# Patient Record
(demographics unavailable — no encounter records)

---

## 2018-12-01 NOTE — XMS REPORT
Summary of Care: 1/10/17 - 1/10/17

                             Created on: 2041



ALEJANDRO JACKSON

External Reference #: [id number]

: 1970

Sex: Female



Demographics







                          Address                   [address]

[place]  [address]-

 

                          Home Phone                [phone]

 

                          Preferred Language        English

 

                          Marital Status            Single

 

                          Faith Affiliation     Taoist

 

                          Race                      Other

 

                          Ethnic Group              /Latin





Author







                          Author                    [facility]

 

                          Organization              [facility]

 

                          Address                   Unknown

 

                          Phone                     Unavailable







Encounter





HQ El_antonio(FRANK) [id number] Date(s): 1/10/17 - 1/10/17

[facility] [address] [place] [address]-     [phone]
[phone]

Discharge Disposition: Home or Self Care

Attending Physician: Ro Wilson MD

Admitting Physician: Ro Wilson MD





Vital Signs





No data available for this section



Problem List







    



              Condition     Effective Dates     Status       Health Status     Informant

 

    



                           Anxiety(Confirmed)        Active  

 

    



                           Asthma(Confirmed)         Active  

 

    



                           Back pain(Confirmed)      Active  

 

    



                           Depression(Confirmed      Active  



                                         )    

 

    



                           Reflux(Confirmed)         Active  

 

    



                           Short of breath on        Active  



                                         exertion(Confirmed)    







Allergies, Adverse Reactions, Alerts







   



                 Substance       Reaction        Severity        Status

 

   



                           aspirin                   Active

 

   



                           Bactrim                   Active

 

   



                     Latex               Blisters            Active

 

   



                           penicillin                Active

 

   



                           sulfa drugs               Active







Medications





No data available for this section



Results





No data available for this section



Immunizations





No data available for this section



Procedures







   



                 Procedure       Date            Related Diagnosis     Body Site

 

   



                                         Appendectomy   

 

   



                                         Bladder operation   

 

   



                                         Cholecystectomy   

 

   



                                         Fusion of lumbar spine   

 

   



                                         Hernia repair   

 

   



                                         Hysterectomy   

 

   



                                         Procedure1   







1multiple back procedures



Social History







 



                           Social History Type       Response

 

 



                           Smoking Status            Former smoker; Type: Cigarettes; Exposure to Tobacco Smoke None;

 Cigarette



                                         Smoking Last 365 Days No; Reg Smoking Cessation Counseling No







Assessment and Plan





No data available for this section

## 2018-12-01 NOTE — XMS REPORT
Summary of Care: 17 - 17

                             Created on: 07/15/2126



ALEJANDRO JACKSON

External Reference #: [id number]

: 1970

Sex: Female



Demographics







                          Address                   [address] 

[place]  [address]-

 

                          Home Phone                [phone]

 

                          Preferred Language        English

 

                          Marital Status            Single

 

                          Baptist Affiliation     Restorationist

 

                          Race                      Other

 

                          Ethnic Group              /Latin





Author







                          Author                    [facility]

 

                          Organization              [facility]

 

                          Address                   Unknown

 

                          Phone                     Unavailable







Encounter





DENISSE Huggins(FRANK) [id number] Date(s): 17 - 17

[facility] [address] [place] [address]-     [phone]
[phone]

Discharge Diagnosis: Acute bronchitis

Discharge Disposition: Home or Self Care

Attending Physician: Navid Nelson MD





Vital Signs







                    1                   2                   3



                                         Most recent to   



                                         oldest [Reference   



                                         Range]:   

 

                                        157.48 cm 

                    (17 8:50 AM)                         



                                         Height   

 

                                        98.4 DegF 

                          (17 1:47 PM)         98.4 DegF 

                          (17 8:50 AM)          



                                         Temperature Oral   



                                         [96.4-99.1 DegF]   

 

                                        115/57 mmHg 

                          (17 1:47 PM)         96/55 mmHg 

                          (17 12:53 PM)        99/50 mmHg 

                                        (17 11:30 AM)



                                         Blood Pressure   



                                         [/60-90 mmHg]   

 

                                        15 BRMIN 

                          (17 1:47 PM)         15 BRMIN 

                          (17 12:53 PM)        14 BRMIN 

                                        (17 11:30 AM)



                                         Respiratory Rate   



                                         [14-20 BRMIN]   

 

                                        77 bpm 

                    (17 8:50 AM)                         



                                         Peripheral Pulse   



                                         Rate [ bpm]   

 

                                        109.091 kg 

                    (17 8:50 AM)                         



                                         Weight   

 

                                        43.99 m2 

                    (17 8:50 AM)                         



                                         Body Mass Index   







Problem List







    



              Condition     Effective Dates     Status       Health Status     Informant

 

    



                           Anxiety(Confirmed)        Active  

 

    



                           Asthma(Confirmed)         Active  

 

    



                           Back pain(Confirmed)      Active  

 

    



                           Depression(Confirmed      Active  



                                         )    

 

    



                           Reflux(Confirmed)         Active  

 

    



                           Short of breath on        Resolved  



                                         exertion(Confirmed)    







Allergies, Adverse Reactions, Alerts







   



                 Substance       Reaction        Severity        Status

 

   



                           aspirin                   Active

 

   



                           Bactrim                   Active

 

   



                     Latex               Blisters            Active

 

   



                           lovastatin                Active

 

   



                           methadone                 Active

 

   



                           penicillin                Active

 

   



                           sulfa drugs               Active







Medications





albuterol-ipratropium 2.5-0.5 mg inhalation solution

3 mL, Route: NEB, Drug Form: SOLN, Dosing Weight 109.091, kg, ONCE, STAT, Start 
date: 17 10:06:00 CST, Stop date: 17 10:06:00 CST

Start Date: 17

Stop Date: 17

Status: Completed



azithromycin 250 mg oral tablet

See Instructions, Take 2 tablets by mouth the first day then 1 tablet by mouth d
aily on days 2-5., X 5 day, # 6 tab, 0 Refill(s)

Start Date: 17

Stop Date: 17

Status: Ordered



methylPREDNISolone SODium SUCCinate

125 mg, 2 mL, Route: IVP, Drug form: INJ, ONCE, Dosing Weight 109.091, kg, Prior
ity: STAT, Start date: 17 10:06:00 CST, Stop date: 17 10:06:00 CST

Notes: (Same as:Solu-MEDROL, A-Methapred)

Start Date: 17

Stop Date: 17

Status: Completed



NS (Bolus) IV

1,000 mL, 1,000 ml/hr, Infuse Over: 1 hr, Route: IV, ONCE, Priority: STAT, Dosin
g Weight 109.091 kg, Start date: 17 10:52:00 CST, Duration: 1 doses or thania
es, Stop date: 17 10:52:00 CST

Start Date: 17

Stop Date: 17

Status: Completed



predniSONE 50 mg oral tablet

50 mg=1 tab, PO, Daily, X 5 day, # 5 tab, 0 Refill(s)

Start Date: 17

Stop Date: 17

Status: Ordered



Results





ELECTROLYTES





 



                           Most recent to            1



                                         oldest [Reference 



                                         Range]: 

 

 



                           Sodium Lvl [135-145       133 mEq/L



                           mEq/L]                    *LOW*



                                         (17 10:42 AM)

 

 



                           Potassium Lvl             3.8 mEq/L



                           [3.5-5.1 mEq/L]           (17 10:42 AM)

 

 



                           Chloride Lvl [      94 mEq/L



                           mEq/L]                    *LOW*



                                         (17 10:42 AM)

 

 



                           CO2 [24-32 mEq/L]         31 mEq/L



                                         (17 10:42 AM)

 

 



                           AGAP [10.0-20.0           11.8 mEq/L



                           mEq/L]                    (17 10:42 AM)







CHEM PANEL





 



                           Most recent to            1



                                         oldest [Reference 



                                         Range]: 

 

 



                           Creatinine Lvl            0.78 mg/dL



                           [0.50-1.40 mg/dL]         (17 10:42 AM)

 

 



                           eGFR                      92 mL/min/1.73m2 1



                                         *NA*



                                         (17 10:42 AM)

 

 



                           BUN [7-22 mg/dL]          13 mg/dL



                                         (17 10:42 AM)

 

 



                           B/C Ratio [6-25]          17



                                         (17 10:42 AM)

 

 



                           Glucose Lvl [70-99        99 mg/dL



                           mg/dL]                    (17 10:42 AM)

 

 



                           Total Protein             8.2 g/dL



                           [6.4-8.4 g/dL]            (17 10:42 AM)

 

 



                           Albumin Lvl [3.5-5.0      3.8 g/dL



                           g/dL]                     (17 10:42 AM)

 

 



                           Globulin [2.7-4.2         4.4 g/dL



                           g/dL]                     *HI*



                                         (17 10:42 AM)

 

 



                           A/G Ratio [0.7-1.6]       0.9



                                         (17 10:42 AM)

 

 



                           Calcium Lvl               9.2 mg/dL



                           [8.5-10.5 mg/dL]          (17 10:42 AM)

 

 



                           ALT [0-65 unit/L]         21 unit/L



                                         (17 10:42 AM)

 

 



                           AST [0-37 unit/L]         19 unit/L



                                         (17 10:42 AM)

 

 



                           Alk Phos [          158 unit/L



                           unit/L]                   *HI*



                                         (17 10:42 AM)

 

 



                           Bili Total [0.2-1.3       0.7 mg/dL



                           mg/dL]                    (17 10:42 AM)







1Result Comment: The eGFR is calculated using the CKD-EPI formula. In most 
young, healthy individuals the eGFR will be >90 mL/min/1.73m2. The eGFR declines
with age. An eGFR of 60-89 may be normal in some populations, particularly the 
elderly, for whom the CKD-EPI formula has not been extensively validated. Use of
the eGFR is not recommended in the following populations:



Individuals with unstable creatinine concentrations, including pregnant patients
and those with serious co-morbid conditions.



Patients with extremes in muscle mass or diet. 



The data above are obtained from the National Kidney Disease Education Program (
NKDEP) which additionally recommends that when the eGFR is used in patients with
extremes of body mass index for purposes of drug dosing, the eGFR should be mul
tiplied by the estimated BMI.



CARDIAC ENZYMES





 



                           Most recent to            1



                                         oldest [Reference 



                                         Range]: 

 

 



                           Total CK [          57 unit/L



                           unit/L]                   (17 10:42 AM)

 

 



                           CK MB [0.5-3.6            <0.5 ng/mL



                           ng/mL]                    (17 10:42 AM)

 

 



                           CK MB Index               <0.9



                           [0.0-2.5]                 (17 10:42 AM)

 

 



                           Troponin-I                <0.02 ng/mL



                           [0.00-0.40 ng/mL]         (17 10:42 AM)







ENDOCRINOLOGY





 



                           Most recent to            1



                                         oldest [Reference 



                                         Range]: 

 

 



                           hCG Tot                   <1 mIU/mL



                                         *NA*



                                         (17 10:42 AM)







HEMATOLOGY





 



                           Most recent to            1



                                         oldest [Reference 



                                         Range]: 

 

 



                           WBC [3.7-10.4 K/CMM]      9.4 K/CMM



                                         (17 10:42 AM)

 

 



                           RBC [4.20-5.40            4.20 M/CMM



                           M/CMM]                    (17 10:42 AM)

 

 



                           Hgb [12.0-16.0 g/dL]      12.2 g/dL



                                         (17 10:42 AM)

 

 



                           Hct [36.0-48.0 %]         36.5 %



                                         (17 10:42 AM)

 

 



                           MCV [80.0-98.0 fL]        87.0 fL



                                         (17 10:42 AM)

 

 



                           MCH [27.0-31.0 pg]        29.1 pg



                                         (17 10:42 AM)

 

 



                           MCHC [32.0-36.0           33.5 g/dL



                           g/dL]                     (17 10:42 AM)

 

 



                           RDW [11.5-14.5 %]         14.1 %



                                         (17 10:42 AM)

 

 



                           Platelet [133-450         374 K/CMM



                           K/CMM]                    (17 10:42 AM)

 

 



                           MPV [7.4-10.4 fL]         8.7 fL



                                         (17 10:42 AM)

 

 



                           Segs [45.0-75.0 %]        79.0 %



                                         *HI*



                                         (17 10:42 AM)

 

 



                           Lymphocytes               11.2 %



                           [20.0-40.0 %]             *LOW*



                                         (17 10:42 AM)

 

 



                           Monocytes [2.0-12.0       7.5 %



                           %]                        (17 10:42 AM)

 

 



                           Eosinophils [0.0-4.0      1.8 %



                           %]                        (17 10:42 AM)

 

 



                           Basophils [0.0-1.0        0.5 %



                           %]                        (17 10:42 AM)

 

 



                           Segs-Bands #              7.4 K/CMM



                           [1.5-8.1 K/CMM]           (17 10:42 AM)

 

 



                           Lymphocytes #             1.1 K/CMM



                           [1.0-5.5 K/CMM]           (17 10:42 AM)

 

 



                           Monocytes # [0.0-0.8      0.7 K/CMM



                           K/CMM]                    (17 10:42 AM)

 

 



                           Eosinophils #             0.2 K/CMM



                           [0.0-0.5 K/CMM]           (17 10:42 AM)







Immunizations





No data available for this section



Procedures







   



                 Procedure       Date            Related Diagnosis     Body Site

 

   



                                         Appendectomy   

 

   



                                         Bladder operation   

 

   



                                         Cholecystectomy   

 

   



                                         Fusion of lumbar spine   

 

   



                                         Hernia repair   

 

   



                                         Hysterectomy   

 

   



                                         Implantation of electronic stimulator of   



                                         spine   

 

   



                                         Procedure1   







1multiple back procedures



Social History







 



                           Social History Type       Response

 

 



                           Substance Abuse           Use: None.

 

 



                           Alcohol                   Never, Previous treatment: None.

 

 



                           Smoking Status            Former smoker; Type: Cigarettes; Exposure to Tobacco Smoke None;

 Cigarette



                                         Smoking Last 365 Days No; Reg Smoking Cessation Counseling No







Assessment and Plan





No data available for this section

## 2018-12-01 NOTE — XMS REPORT
Summary of Care: 5/15/17 - 5/15/17

                             Created on: 2034



ALEJANDRO JACKSON

External Reference #: [id number]

: 1970

Sex: Female



Demographics







                          Address                   [address]

[place]  [address]-

 

                          Home Phone                [phone]

 

                          Preferred Language        English

 

                          Marital Status            Single

 

                          Christianity Affiliation     Rastafari

 

                          Race                      Other

 

                          Ethnic Group              /Latin





Author







                          Author                    [facility]

 

                          Organization              [facility]

 

                          Address                   Unknown

 

                          Phone                     Unavailable







Encounter





DENISSE Huggins(FRANK) [id number] Date(s): 5/15/17 - 5/15/17

[facility] [address] [place] [address]-     [phone]
[phone]

Discharge Disposition: Home or Self Care

Attending Physician: Lobo Mccord MD

Referring Physician: Lobo Mccord MD





Vital Signs







                    1                   2                   3



                                         Most recent to   



                                         oldest [Reference   



                                         Range]:   

 

                                        157.48 cm 

                    (17 11:04 AM)                         



                                         Height   

 

                                        98.0 DegF 

                    (17 11:04 AM)                         



                                         Temperature Oral   



                                         [96.4-99.1 DegF]   

 

                                        105/89 mmHg 

                          (5/15/17 9:45 AM)         150/65 mmHg 

*HI*

                          (5/15/17 9:30 AM)         116/67 mmHg 

                                        (5/15/17 9:00 AM)



                                         Blood Pressure   



                                         [/60-90 mmHg]   

 

                                        20 BRMIN 

                          (5/15/17 10:00 AM)        18 BRMIN 

                          (5/15/17 9:45 AM)         16 BRMIN 

                                        (5/15/17 9:15 AM)



                                         Respiratory Rate   



                                         [14-20 BRMIN]   

 

                                        92 bpm 

                    (17 11:04 AM)                         



                                         Peripheral Pulse   



                                         Rate [ bpm]   

 

                                        104.176 kg 

                    (17 11:04 AM)                         



                                         Weight   

 

                                        42.01 m2 

                    (17 11:04 AM)                         



                                         Body Mass Index   







Problem List







    



              Condition     Effective Dates     Status       Health Status     Informant

 

    



                           Anxiety(Confirmed)        Active  

 

    



                           Asthma(Confirmed)         Active  

 

    



                           Back pain(Confirmed)      Active  

 

    



                           Depression(Confirmed      Active  



                                         )    

 

    



                           Reflux(Confirmed)         Active  

 

    



                           Short of breath on        Resolved  



                                         exertion(Confirmed)    







Allergies, Adverse Reactions, Alerts







   



                 Substance       Reaction        Severity        Status

 

   



                           aspirin                   Active

 

   



                           Bactrim                   Active

 

   



                     Latex               Blisters            Active

 

   



                           lovastatin                Active

 

   



                           methadone                 Active

 

   



                           penicillin                Active

 

   



                           sulfa drugs               Active







Medications





acetaminophen-oxycodone 325 mg-10 mg oral tablet

2 tab, PO, Q6H, PRN for pain, 0 Refill(s)

Start Date: 17

Stop Date: 17

Status: Ordered



baclofen 20 mg oral tablet

20 mg=1 tab, PO, TID, # 270 tab, 0 Refill(s)

Start Date: 17

Status: Ordered



dicyclomine

20 mg, 0 Refill(s)

Start Date: 17

Status: Ordered



furosemide 40 mg oral tablet

40 mg=1 tab, PO, Daily, # 30 tab, 0 Refill(s)

Start Date: 17

Status: Ordered



Movantik 25 mg oral tablet

25 mg=1 tab, PO, QAM, 0 Refill(s)

Start Date: 17

Status: Ordered



naloxone 1 mg/mL injectable solution

IV, ONCE, 0 Refill(s)

Start Date: 17

Status: Ordered



omeprazole 40 mg oral delayed release capsule

40 mg=1 cap, PO, Daily, # 30 cap, 0 Refill(s)

Start Date: 17

Status: Ordered



phentermine 37.5 mg oral tablet

37.5 mg=1 tab, PO, Daily, # 30 tab, 0 Refill(s)

Start Date: 17

Stop Date: 17

Status: Ordered



sertraline 50 mg oral tablet

50 mg=1 tab, PO, Daily, # 30 tab, 0 Refill(s)

Start Date: 17

Status: Ordered



Singulair

Daily, 0 Refill(s)

Start Date: 17

Status: Ordered



Sodium Chloride 0.9% IV 1000 mL

1,000 mL, Rate: 25 ml/hr, Infuse over: 40 hr, Route: IV, Dosing Weight 104.176 k
g, Total Volume: 1,000, Start date: 05/15/17 7:02:00 CDT, Duration: 30 day, Stop
date: 17 7:01:00 CDT

Start Date: 5/15/17

Stop Date: 5/15/17

Status: Discontinued



Results





ELECTROLYTES





 



                           Most recent to            1



                                         oldest [Reference 



                                         Range]: 

 

 



                           Sodium Lvl [135-145       139 mEq/L



                           mEq/L]                    (17 11:13 AM)

 

 



                           Potassium Lvl             3.5 mEq/L



                           [3.5-5.1 mEq/L]           (17 11:13 AM)

 

 



                           Chloride Lvl [      102 mEq/L



                           mEq/L]                    (17 11:13 AM)

 

 



                           CO2 [24-32 mEq/L]         26 mEq/L



                                         (17 11:13 AM)

 

 



                           AGAP [10.0-20.0           14.5 mEq/L



                           mEq/L]                    (17 11:13 AM)







Immunizations





No data available for this section



Procedures







   



                 Procedure       Date            Related Diagnosis     Body Site

 

   



                                         Appendectomy   

 

   



                                         Bladder operation   

 

   



                                         Cholecystectomy   

 

   



                                         Fusion of lumbar spine   

 

   



                                         Hernia repair   

 

   



                                         Hysterectomy   

 

   



                                         Implantation of electronic stimulator of   



                                         spine   

 

   



                                         Procedure1   







1multiple back procedures



Social History







 



                           Social History Type       Response

 

 



                           Substance Abuse           Use: None.

 

 



                           Alcohol                   Never, Previous treatment: None.

 

 



                           Smoking Status            Former smoker; Type: Cigarettes; Exposure to Tobacco Smoke None;

 Cigarette



                                         Smoking Last 365 Days No; Reg Smoking Cessation Counseling No







Assessment and Plan





No data available for this section

## 2018-12-01 NOTE — XMS REPORT
Encounter CCD: 2012 to 2012

                             Created on: 2045



ALEJANDRO JACKSON

External Reference #: [id number]

: 1970

Sex: Female



Demographics







                          Address                   [address] [place] [address]  [address]

 

                          Home Phone                [phone]

 

                          Preferred Language        Unknown

 

                          Marital Status            [employer]

 

                          Hoahaoism Affiliation     Voodoo

 

                          Race                      Other

 

                          Ethnic Group              /Latin





Author







                          Author                    [employer]

 

                          Organization              [facility]

 

                          Address                   Unknown

 

                          Phone                     Unavailable







Care Team Providers







                    Care Team Member Name    Role                Phone

 

                    Ro Wilson        RP                  [phone]







Allergies, Adverse Reactions, Alerts







  



                     Substance           Reaction            Status

 

  



                           aspirin                   Active

 

  



                           Bactrim                   Active

 

  



                     Latex               Blisters            Active

 

  



                           penicillin                Active

 

  



                           sulfa drugs               Active







Medications







    



              Medication     Instructions     Start Date     End Date     Status

 

    



              Benadryl     25 mg, Route: IVP, ONCE, PRN     2012     Completed



                                         Itching, Start date: 12   



                                         11:02:00   

 

    



              lidocaine 1%     0.5 mL, Route: SUB-Q, Drug Form:     01/10/2012     2012     Discontinued





                                         INJ, ONCALL, Start date: 01/10/12   



                                         15:00:00, Duration: 1 doses or   



                                         times   

 

    



              Lactated Ringers     1,000 mL, Rate: 25 ml/hr, Infuse     01/10/2012     2012    

 Discontinued



                           Injection IV 1,000        over: 40 hr, Route: IV, Total   



                           mL                        Volume: 1,000, Start date: 01/10/12   



                                         14:44:00, Duration: 30 day, Stop   



                                         date: 12 14:43:00   

 

    



                 Ventolin HFA     INHALATION, Substitution Allowed,     2012      Ordered



                                         Maintenance   







Vital Signs







                Most recent to oldest [Reference Range]:    1               2               3

 

                          Height                    157.48 cm 

                    (01/10/2012 14:44:00)                           

 

                          Temperature Oral [96.4-99.1 DegF]    98.1 DegF 

                    (01/10/2012 15:08:00)                           

 

                          Systolic Blood Pressure [ mmHg]    119 mmHg 

                          (2012 11:30:00)      133 mmHg 

                          (2012 11:00:00)      134 mmHg 

                                        (2012 10:45:00)  

 

                          Diastolic Blood Pressure [60-90 mmHg]    65 mmHg 

                          (2012 11:30:00)      64 mmHg 

                          (2012 11:00:00)      81 mmHg 

                                        (2012 10:45:00)  

 

                          Respiratory Rate [14-20 BRMIN]    15 BRMIN 

                          (2012 11:00:00)      15 BRMIN 

                          (2012 10:45:00)      20 BRMIN 

                                        (2012 08:51:00)  

 

                          Peripheral Pulse Rate [ bpm]    82 bpm 

                          (2012 08:51:00)      70 bpm 

                          (01/10/2012 15:08:00)       

 

                          Weight                    95.455 kg 

                    (01/10/2012 14:44:00)

## 2018-12-01 NOTE — DIAGNOSTIC IMAGING REPORT
EXAM: Abdomen 2  Views

INDICATION:     

^states constipation

^[id number]

^1240

COMPARISON: CT dated 10/11/2016



FINDINGS:

Right lower abdomen spine stimulator device with leads overlying the thoracic

spine vertebral bodies.

Lung bases are clear.

Dilated gas-filled bowel loops in the central abdomen. No signs of

pneumoperitoneum.

No acute osseous or mildly. Narrowing of lower lumbar spine disc spaces. Lower

lumbar spine fixating pin.





IMPRESSION:

1.  Gaseous distention of bowel loops, concerning for at least partial

obstruction.



Signed by: Dr. Henry Centeno MD on 12/1/2018 1:38 PM

## 2018-12-01 NOTE — XMS REPORT
Encounter??CCD:??10/26/2011??to??10/26/2011

                             Created on: 2076



ALEJANDRO JACKSON

External Reference #: [id number]

: 1970

Sex: Female



Demographics







                          Address                   [address] [place]  [address]-

 

                          Home Phone                [phone]

 

                          Preferred Language        Unknown

 

                          Marital Status            T

 

                          Jain Affiliation     OT

 

                          Race                      [id number]

 

                          Ethnic Group              OTH





Author







                          Author                    [employer]

 

                          Organization              [facility]

 

                          Address                   Unknown

 

                          Phone                     Unavailable







Care Team Providers







                    Care Team Member Name    Role                Phone

 

                    Rowan Carty      CP                  [phone]

 

                    Mary Hammond    CP                  [phone]

 

                    [employer], Login    CP                  Unavailable

 

                    Devon Reyes Germán     CP                  [phone]

 

                    Sindhu Cheema    CP                  Unavailable

 

                    Keke Wilsonjuan        RP                  [phone]

 

                    SYSTEM, SYSTEM      CP                  Unavailable

 

                    Nel Lovell    CP                  [phone]

 

                    Shamar Morrison     CP                  Unavailable

 

                    Nita Barber        CP                  [phone]

 

                    Destiny Oliva    CP                  Unavailable

 

                    Dacia Burns    CP                  [phone]







Allergies, Adverse Reactions, Alerts







  



                     Substance           Reaction            Status

 

  



                     aspirin             ??                  Active

 

  



                     Bactrim             ??                  Active

 

  



                     penicillin          ??                  Active

 

  



                     sulfa drugs         ??                  Active







Medications







    



              Medication     Instructions     Start Date     End Date     Status

 

    



              clindamycin 150 mg     1 cap, PO, Q6H, 40 cap,     10/25/2011     ??           Ordered



                           oral capsule              Substitution Allowed, CAP   

 

    



              ondansetron     4 mg, 2 mL, Route: IVP, Drug form:     10/26/2011     10/26/2011     Discontinued





                                         INJ, ONCE, PRN Nausea & Vomiting,   



                                         Start date: 10/26/11 8:37:00   

 

    



              flumazenil     0.2 mg, 2 mL, Route: IVP, Drug     10/26/2011     10/26/2011     Discontinued





                                         form: INJ, PRN, PRN Other -See   



                                         Comment, Initial dose, Start date:   



                                         10/26/11 8:37:00, Duration: 30 day,   



                                         Stop date: 11 7:36:00   

 

    



              naloxone     0.04 mg, 0.1 mL, Route: IVP, Drug     10/26/2011     10/26/2011     Discontinued





                                         form: INJ, Q2MIN, PRN Narcotic   



                                         Reversal, Start date: 10/26/11   



                                         8:37:00, Duration: 8 doses or   



                                         times, Stop date: Limited # of   



                                         times   

 

    



              meperidine     12.5 mg, 0.25 mL, Route: IVP, Drug     10/26/2011     10/26/2011     Discontinued





                                         form: INJ, Q30Min, PRN Other -See   



                                         Comment, For shivering, Start date:   



                                         10/26/11 8:37:00, Duration: 2 doses   



                                         or times, Stop date: Limited # of   



                                         times   

 

    



              hydromorphone     0.5 mg, 0.5 mL, Route: IVP, Drug     10/26/2011     10/26/2011     Discontinued





                                         form: SOLN, Q5Min, PRN Pain Score   



                                         4-6, Start date: 10/26/11 8:37:00,   



                                         Duration: 5 doses or times, Stop   



                                         date: Limited # of times   

 

    



              fentanyl     25 microgram, 0.5 mL, Route: IVP,     10/26/2011     10/26/2011     Discontinued





                                         Drug form: INJ, Q5Min, PRN Pain   



                                         Score 4-6, Start date: 10/26/11   



                                         8:37:00, Duration: 4 doses or   



                                         times, Stop date: Limited # of   



                                         times   

 

    



                 acetaminophen-hydroc     2 tab, Route: PO, Drug Form: TAB,     10/26/2011      10/26/2011

                                         Discontinued



                           odone 325 mg-5 mg         Q4H, PRN Pain Score 4-6, Start   



                           oral tablet               date: 10/26/11 8:37:00, Duration:   



                                         30 day, Stop date: 11 8:36:00   

 

    



              lidocaine 1%     0.5 mL, Route: SUB-Q, Drug Form:     10/26/2011     10/26/2011     Discontinued





                                         INJ, ONCALL, Start date: 10/26/11   



                                         8:00:00, Duration: 1 doses or times   

 

    



              Lactated Ringers     1,000 mL, Rate: 25 ml/hr, Infuse     10/26/2011     10/26/2011    

 Discontinued



                           Injection IV 1,000        over: 40 hr, Route: IV, Total   



                           mL                        Volume: 1,000, Start date: 10/26/11   



                                         7:33:00, Duration: 30 day, Stop   



                                         date: 11 7:32:00   

 

    



              Erythrocin Stearate     1 tab, PO, Q8H, 40 tab,     10/25/2011     ??           Ordered



                           Filmtab 250 mg oral       Substitution Allowed, TAB   



                                         tablet    

 

    



              Nexium 40 mg oral     1 cap, PO, Daily, 30 cap,     10/25/2011     ??           Ordered



                           delayed release           Substitution Allowed   



                                         capsule    

 

    



              Norco 10/325 oral     1 tab, PO, BID, PRN, 24 tab, for     10/25/2011     ??           Ordered





                           tablet                    pain, Substitution Allowed,   



                                         Maintenance   







Vital Signs







                Most recent to oldest [Reference Range]:    1               2               3

 

                          Height                    160.02 cm 

                    (10/26/2011 07:33:00) ??    ??                  ??

 

                          Temperature Oral [96.4-99.1 DegF]    97.6 DegF 

                    (10/26/2011 07:24:00) ??    ??                  ??

 

                          Systolic Blood Pressure [ mmHg]    128 mmHg 

                          (10/26/2011 09:15:00) ??    133 mmHg 

                          (10/26/2011 09:00:00) ??    126 mmHg 

                                        (10/26/2011 08:45:00) ??

 

                          Diastolic Blood Pressure [60-90 mmHg]    60 mmHg 

                          (10/26/2011 09:15:00) ??    55 mmHg 

*LOW*

                          (10/26/2011 09:00:00) ??    80 mmHg 

                                        (10/26/2011 08:45:00) ??

 

                          Respiratory Rate [14-20 BRMIN]    18 BRMIN 

                          (10/26/2011 09:15:00) ??    21 BRMIN 

*HI*

                          (10/26/2011 09:00:00) ??    20 BRMIN 

                                        (10/26/2011 08:45:00) ??

 

                          Peripheral Pulse Rate [ bpm]    81 bpm 

                    (10/26/2011 07:24:00) ??    ??                  ??

 

                          Weight                    81.818 kg 

                    (10/26/2011 07:33:00) ??    ??                  ??

## 2018-12-01 NOTE — XMS REPORT
Continuity of Care Document

                             Created on: 10/15/2018



ALEJANDRO JACKSON

External Reference #: [id number]

: 1970

Sex: Female



Demographics







                          Address                   [address]  [address]

 

                          Home Phone                [phone]

 

                          Preferred Language        Unknown

 

                          Marital Status            Unknown

 

                          Buddhism Affiliation     Unknown

 

                          Race                      Unknown

 

                          Ethnic Group              Unknown





Author







                          Author                    [facility]              Interface

 

                          Address                   Unknown

 

                          Phone                     Unavailable



                                                    



Problems

                    





                    Problem                            Status                            Onset Date     

                          Classification                            Date Reported       

                          Comments                            Source                    

 

                    DX: M54.16                            Active                            10/10/2018  

                                                                                       

                                        [facility]                    

 

                          DX: RT MASS **CALLBACKS**                            Active                       

                    2018                                                                        

                                                      [facility]                    

 

                          ROUTINE SCREENING **LAST MMG W/**                            Active             

                    2018                                                              

                                                      [facility]                    

 

                          Discharge Diagnosis: Acute bronchitis                                             

                    2017

                                                        [facility]                 

   

 

                    NAUSEA/VOMITING                            Active                            2017

                                                                                       

                                        [facility]                    

 

                    UNK                            Active                            2017         

                                                                                        

                                        [facility]                    

 

                    DX: 6 MONTH FOLLOWUP                            Active                            2017

                                                                                       

                                        [facility]                    

 

                          LYNNE DELVALLE #[phone] FAX#[phone]                            Active        

                    2016                                                         

                                                       [facility]                  

  

 

                          793.80 ***LYNNE AGUIRRE PHONE#[phone]                            Active        

                    2015                                                         

                                                       [facility]                  

  

 

                          .4 724.4 / CPT 25041  91242                             Active        

                    10/13/2014                                                         

                                                       [facility]                  

  

 

                          793.80 ABNORMAL FINDING ON MAMMOGRAPHY                            Active          

                    2014                                                           

                                                      [facility]                    



 

                          V76.11 - SCREEN MAMMOGRA                            Active                        

                    2014                                                                         

                                                       [facility] [place]                    

 

                          715.09 - GENERAL OSTEOAR                            Active                        

                    2013                                                                         

                                                       [facility] [place]                    

 

                    721.3                            Active                            2013       

                                                                                       

                                        [facility]                    

 

                          .3 / CPT 41197 59784 09718                            Active               

                    2012                                                                

                                                      [facility]                    

 

                          INJECTION EPIDURAL STEROID BLOCK                            Active                

                    10/19/2011                                                                 

                                                      [facility]                    

 

                    Anxiety                            Active                                           

                    Problem                            2017                             

                                        [facility] [place]                    

 

                    Asthma                            Active                                            

                    Problem                            2017                              

                                        [facility] [place]                    

 

                    Back pain                            Active                                         

                          Problem                            2017                         

                                                      [facility] [place]                    

 

                    Depression                            Active                                        

                          Problem                            2017                        

                                                      [facility] [place]                    

 

                    Reflux                            Active                                            

                    Problem                            2017                              

                                        [facility] [place]                    

 

                          Short of breath on exertion                            Resolved                   

                                                Problem                            2017     

                                                      [facility] [place]     

               

 

                    Anxiety                            Resolved                                         

                          Problem                            2013                         

                                                       [facility]                    

 

                    Asthma                            Resolved                                          

                    Problem                            2013                            

                                         [facility] [place]                    

 

                    Back pain                            Resolved                                       

                          Problem                            2013                       

                                                       [facility] [place],[facility]                    

 

                          Depression annual review                            Resolved                      

                                                Problem                            2013        

                                                       [facility] [place],[facility]      

              

 

                    Reflux                            Resolved                                          

                    Problem                            2013                            

                                         [facility] [place]                    

 

                    cough                            Active                                             

                    Problem                            2014                               

                                         [facility] [place]                    

 

                    seizures                            Active                                          

                    Problem                            2014                            

                                         [facility] [place]                    

 

                          Depression annual review                            Resolved                      

                                                Problem                            2014        

                                                      [facility]                    

 

                    724.4                            Active                                             

                                                                                                

                                        [facility]                    

 

                          OTH ABN AND INCONCLUSIVE FINDINGS ON DX                             Active        

                                                                                       

                                                      [facility]                    

 

                          ENCNTR FOR F/U EXAM AFT TRTMT FOR COND O                            Active        

                                                                                       

                                                      [facility]                    

 

                          RADICULOPATHY, LUMBAR REGION                            Active                    

                                                                                                   

                                                      [facility]                    

 

                          RADICULOPATHY, LUMBAR REGION                            Active                    

                                                                                                   

                                                      [facility]                    



                                                                                
                                                                                
                                                                                
                                                                                
                                                                                
                                                                                
                                                                                
                                                                 



Medications

                    





                    Medication                            Details                            Route      

                          Status                            Patient Instructions         

                          Ordering Provider                            Order Date           

                                        Source                    

 

                          predniSONE 50 mg oral tablet                            50 mg=1 tab, PO, Daily, X 

5 day, # 5 tab, 0 Refill(s)                                                        Active

                                                                                    2017

                                        [facility]                    

 

                          azithromycin 250 mg oral tablet                            See Instructions, Take 

2 tablets by mouth the first day then 1 tablet by mouth daily on days 2-5., X 5 
day, # 6 tab, 0 Refill(s)                                                        Active

                                                                                    2017

                                        [facility]                    

 

                          NS (Bolus) IV                            1,000 mL, 1,000 ml/hr, Infuse Over: 1 hr,

 Route: IV, ONCE, Priority: STAT, Dosing Weight 109.091 kg, Start date: 17
10:52:00 CST, Duration: 1 doses or times, Stop date: 17 10:52:00 CST      
                                                 Inactive                              

                                                      2017                     

                                        [facility]                    

 

                          methylPREDNISolone SODium SUCCinate                            125 mg, 2 mL, Route:

 IVP, Drug form: INJ, ONCE, Dosing Weight 109.091, kg, Priority: STAT, Start 
date: 17 10:06:00 CST, Stop date: 17 10:06:00 CSTNotes: (Same 
as:Solu-MEDROL, A-Methapred)                                                        Inactive

                                                                                    2017

                                        [facility]                    

 

                          albuterol-ipratropium 2.5-0.5 mg inhalation solution                            3 

mL, Route: NEB, Drug Form: SOLN, Dosing Weight 109.091, kg, ONCE, STAT, Start 
date: 17 10:06:00 CST, Stop date: 17 10:06:00 CST                   
                                                Inactive                                           

                                                2017                            [facility]

                    

 

                          Sodium Chloride 0.154 MEQ/ML Injectable Solution                            1,000 

mL, Rate: 25 ml/hr, Infuse over: 40 hr, Route: IV, Dosing Weight 104.176 kg, 
Total Volume: 1,000, Start date: 05/15/17 7:02:00 CDT, Duration: 30 day, Stop 
date: 17 7:01:00 CDT                                                        Inactive

                                                                                    05/15/2017

                                        [facility]                    

 

                    Singulair                            Daily, 0 Refill(s)                             

                           Active                                                    

                                                2017                            [facility]     

               

 

                          Dicyclomine                            20 mg, 0 Refill(s)                         

                                                Active                                                  

                                                2017                            [facility]   

                 

 

                          omeprazole 40 mg oral delayed release capsule                            40 mg=1 cap,

 PO, Daily, # 30 cap, 0 Refill(s)                                                  

                    Active                                                                           

                          2017                            [facility]                    

 

                          sertraline 50 mg oral tablet                            50 mg=1 tab, PO, Daily, # 

30 tab, 0 Refill(s)                                                        Active    

                                                                                2017

                                        [facility]                    

 

                          Phentermine Hydrochloride 37.5 MG Oral Tablet                            37.5 mg=1

 tab, PO, Daily, # 30 tab, 0 Refill(s)                                             

                    Active                                                                      

                          2017                            [facility]                    

 

                                        Acetaminophen 325 MG / Oxycodone Hydrochloride 10 MG Oral Tablet                

                          2 tab, PO, Q6H, PRN for pain, 0 Refill(s)                                

                        Active                                                         

                           2017                            [facility]        

            

 

                          naloxone 1 mg/mL injectable solution                            IV, ONCE, 0 Refill(s)

                                                        Active                       

                                                                            2017                

                                        [facility]                    

 

                          naloxegol 25 MG Oral Tablet [Movantik]                            25 mg=1 tab, PO,

 QAM, 0 Refill(s)                                                        Active      

                                                                              2017

                                        [facility]                    

 

                          Furosemide 40 MG Oral Tablet                            40 mg=1 tab, PO, Daily, # 

30 tab, 0 Refill(s)                                                        Active    

                                                                                2017

                                        [facility]                    

 

                          baclofen 20 mg oral tablet                            20 mg=1 tab, PO, TID, # 270 

tab, 0 Refill(s)                                                        Active       

                                                                             2017

                                        [facility]                    

 

                          Oxycodone Hydrochloride 5 MG Oral Tablet                            10 mg, Route: 

PO, Drug form: TAB, ONCE, Dosing Weight 90.909, kg, PRN as needed for pain, 
Start date: 10/31/14 13:32:00                                                        

Inactive                                                                             

                          10/31/2014                            [facility]                    

 

                          Promethazine                            6.25 mg, Route: IVPB, ONCE, Dosing Weight 

90.909, kg, PRN Nausea & Vomiting, Start date: 10/31/14 12:55:00                
                                                Inactive                                       

                                                10/31/2014                            [facility]

                    

 

                          Ondansetron                            4 mg, Route: IVP, ONCE, Dosing Weight 90.909,

 kg, PRN Nausea & Vomiting, Start date: 10/31/14 12:55:00                       
                                                Inactive                                              

                                                10/31/2014                            [facility]

                    

 

                          Diphenhydramine                            12.5 mg, Route: IVP, Drug form: INJ, Q6H,

 Dosing Weight 90.909, kg, PRN Itching, Start date: 10/31/14 12:55:00, Duration:
 30 day, Stop date: 14 12:54:00                                              

                    Inactive                                                                     

                          10/31/2014                            [facility]                    



 

                          Naloxone                            0.04 mg, Route: IVP, Q2MIN, Dosing Weight 90.909,

 kg, PRN Narcotic Reversal, Start date: 10/31/14 12:55:00, Duration: 8 doses or 
times, Stop date: Limited # of times                                               

                    Inactive                                                                      

                          10/31/2014                            [facility]                    

 

                          Flumazenil                            0.2 mg, Route: IVP, PRN, Dosing Weight 90.909,

 kg, PRN Benzodiazepine Reversal, Initial dose, Start date: 10/31/14 12:55:00, 
Duration: 30 day, Stop date: 14 11:54:00                                     

                    Inactive                                                            

                          10/31/2014                            [facility]           

         

 

                          Morphine                            4 mg, Route: IVP, Q5Min, Dosing Weight 90.909,

 kg, PRN Pain Score 7-10, Start date: 10/31/14 12:55:00, Duration: 3 doses or 
times, Stop date: Limited # of times                                               

                    Inactive                                                                      

                          10/31/2014                            [facility]                    

 

                          Meperidine                            12.5 mg, Route: IVP, Q30Min, Dosing Weight 90.909,

 kg, PRN Other -See Comment, For shivering, Start date: 10/31/14 12:55:00, 
Duration: 2 doses or times, Stop date: Limited # of times                       
                                                Inactive                                              

                                                10/31/2014                            [facility]

                    

 

                          Hydromorphone                            0.5 mg, Route: IVP, Q5Min, Dosing Weight 

90.909, kg, PRN Pain Score 7-10, Start date: 10/31/14 12:55:00, Duration: 4 
doses or times, Stop date: Limited # of times                                      

                    Inactive                                                             

                          10/31/2014                            [facility]            

        

 

                          Oxycodone Hydrochloride 1 MG/ML Oral Solution                            5 mg, Route:

 NG, Drug form: LIQ, Q4H, Dosing Weight 90.909, kg, PRN Pain Score 4-6, Start 
date: 10/31/14 12:55:00, Duration: 30 day, Stop date: 14 12:54:00         
                                                Inactive                                

                                                      10/31/2014                       

                                        [facility]                    

 

                          Acetaminophen                            1,000 mg, Route: IVPB, Drug form: INJ, ONCE,

 Dosing Weight 90.909, kg, PRN Pain Score 1-3, Start date: 10/31/14 12:55:00, 
Duration: 1 doses or times, Stop date: Limited # of times                       
                                                Inactive                                              

                                                10/31/2014                            [facility]

                    

 

                          Oxycodone                            5 mg, Route: PO, Drug form: TAB, Q4H, Dosing 

Weight 90.909, kg, PRN Pain Score 4-6, Start date: 10/31/14 12:55:00, Duration: 
30 day, Stop date: 14 12:54:00                                               

                    Inactive                                                                      

                          10/31/2014                            [facility]                    

 

                          Fentanyl                            25 microgram, Route: IVP, Q5Min, Dosing Weight

 90.909, kg, PRN Pain Score 4-6, Start date: 10/31/14 12:55:00, Duration: 4 
doses or times, Stop date: Limited # of times                                      

                    Inactive                                                             

                          10/31/2014                            [facility]            

        

 

                                        Calcium Chloride 0.0014 MEQ/ML / Potassium Chloride 0.004 MEQ/ML / Sodium Chloride

 0.103 MEQ/ML / Sodium Lactate 0.028 MEQ/ML Injectable Solution                 
                                        1,000 mL, Rate: 125 ml/hr, Infuse over: 8 hr, Route: IV, Dosing Weight 90.909

 kg, Total Volume: 1,000, Start date: 10/31/14 12:55:00, Duration: 30 day, Stop 
date: 14 12:54:00                                                        Inactive

                                                                                    10/31/2014

                                        [facility]                    

 

                          Clindamycin                            600 mg, Route: IVPB, ONCE, Dosing Weight 90.909,

 kg, Start date: 10/31/14 10:42:00, Stop date: 10/31/14 10:42:00                
                                                Inactive                                       

                                                10/31/2014                            [facility]

                    

 

                                        Calcium Chloride 0.0014 MEQ/ML / Potassium Chloride 0.004 MEQ/ML / Sodium Chloride

 0.103 MEQ/ML / Sodium Lactate 0.028 MEQ/ML Injectable Solution                 
                                        1,000 mL, Rate: 25 ml/hr, Infuse over: 40 hr, Route: IV, Dosing Weight 90.909

 kg, Total Volume: 1,000, Start date: 10/31/14 10:38:00, Duration: 30 day, Stop 
date: 14 10:37:00                                                        Inactive

                                                                                    10/31/2014

                                        [facility]                    

 

                          gabapentin 300 MG Oral Capsule [Neurontin]                            300 mg=1 cap,

 PO, TID, 0 Refill(s)                                                        Active  

                                                                                  10/29/2014

                                        [facility]                    

 

                                        Cyclobenzaprine hydrochloride 10 MG Oral Tablet [Flexeril]                      

                          10 mg=1 tab, PO, TID, 0 Refill(s)                                              

                    Active                                                                       

                          10/29/2014                            [facility]                    

 

                          meloxicam 15 MG Oral Tablet [Mobic]                            15 mg=1 tab, PO, Daily,

 0 Refill(s)                                                        Active           

                                                                            10/29/2014    

                                        [facility]                    

 

                          gabapentin                            300 mg, PO, BID, 0 Refill(s)                

                                                Active                                         

                                                10/29/2014                            [facility]

                    

 

                          Ondansetron 4 MG Oral Tablet [Zofran]                            4 mg=1 tab, PO, Q8H,

 as needed for nausea/vomiting, 0 Refill(s)                                        

                    Active                                                                 

                          10/29/2014                            [facility]                

    

 

                          Clonazepam                            0.5 mg, PO, 0 Refill(s)                     

                                                Active                                              

                                                10/29/2014                            [facility]

                    

 

                          Hydrochlorothiazide                            12.5 mg, PO, 0 Refill(s)           

                                                Active                                    

                                                10/29/2014                            [facility]                    

 

                          cetirizine hydrochloride 10 MG Oral Tablet [Zyrtec]                            10 

mg=1 tab, PO, Daily, 0 Refill(s)                                                   

                    Active                                                                            

                          10/29/2014                            [facility]                    

 

                          quetiapine 25 MG Oral Tablet [Seroquel]                            25 mg=1 tab, PO,

 TID, 0 Refill(s)                                                        Active      

                                                                              10/29/2014

                                        [facility]                    

 

                          valsartan 160 MG Oral Tablet [Diovan]                            160 mg=1 tab, PO,

 Daily, 0 Refill(s)                                                        Active    

                                                                                10/29/2014

                                        [facility]                    

 

                          sertraline 100 mg oral tablet                            100 mg=1 tab, PO, Daily, 

0 Refill(s)                                                        Active            

                                                                            10/29/2014     

                                        [facility]                    

 

                          flumazenil                            0.2 mg, 2 mL, Route: IVP, Drug form: INJ, PRN,

 Dosing Weight 90.909, kg, PRN Benzodiazepine Reversal, Initial dose, Start 
date: 13 8:30:00, Duration: 30 day, Stop date: 13 8:29:00           
                          IVP                            No Longer Active                     

                                                [facility]                            2013         

                                        [facility]                    

 

                          naloxone                            0.04 mg, 0.04 mL, Route: IVP, Drug form: INJ, 

Q2MIN, Dosing Weight 90.909, kg, PRN Narcotic Reversal, Start date: 13 
8:30:00, Duration: 8 doses or times, Stop date: Limited # of times              
                          IVP                            No Longer Active                        

                                                [facility]                            2013            

                                        [facility]                    

 

                          diphenhydrAMINE                            12.5 mg, 0.25 mL, Route: IVP, Drug form:

 INJ, PRN, Dosing Weight 90.909, kg, PRN Itching, Start date: 13 8:30:00, 
Duration: 30 day, Stop date: 13 8:29:00                            IVP       

                          No Longer Active                                                

                    [facility]                            2013                            [facility]

                    

 

                          ondansetron                            4 mg, 2 mL, Route: IVP, Drug form: INJ, ONCE,

 Dosing Weight 90.909, kg, PRN Nausea & Vomiting, Start date: 13 8:30:00  
                          IVP                            No Longer Active            

                                                [facility]                            2013

                                        [facility]                    

 

                          dexamethasone                            4 mg, 1 mL, Route: IVP, Drug form: INJ, ONCE,

 Dosing Weight 90.909, kg, PRN Nausea & Vomiting, Start date: 13 8:30:00  
                          IVP                            No Longer Active            

                                                [facility]                            2013

                                        [facility]                    

 

                          labetalol                            5 mg, 1 mL, Route: IVP, Drug form: INJ, Q5Min,

 Dosing Weight 90.909, kg, PRN Elevated BP, Start date: 13 8:30:00, 
Duration: 5 doses or times, Stop date: Limited # of times                       
                    IVP                            No Longer Active                                   

                          [facility]                            2013                     

                                        [facility]                    

 

                          niCARdipine                            0.25 mg, 0.1 mL, Route: IVP, Drug form: INJ,

 Q5Min, Dosing Weight 90.909, kg, PRN Elevated BP, Start date: 13 8:30:00,
 Duration: 4 doses or times, Stop date: Limited # of times                      
                    IVP                            No Longer Active                                  

                          [facility]                            2013                    

                                        [facility]                    

 

                          metoprolol                            1 mg, 1 mL, Route: IVP, Drug form: INJ, Q5Min,

 Dosing Weight 90.909, kg, PRN Elevated BP, Start date: 13 8:30:00, 
Duration: 5 doses or times, Stop date: Limited # of times                       
                    IVP                            No Longer Active                                   

                          [facility]                            2013                     

                                        [facility]                    

 

                          hydrALAZINE                            5 mg, 0.25 mL, Route: IVP, Drug form: INJ, 

Q5Min, Dosing Weight 90.909, kg, PRN Elevated BP, Start date: 13 8:30:00, 
Duration: 4 doses or times, Stop date: Limited # of times                       
                    IVP                            No Longer Active                                   

                          [facility]                            2013                     

                                        [facility]                    

 

                          morphine Sulfate                            2 mg, 1 mL, Route: IVP, Drug form: INJ,

 Q5Min, Dosing Weight 90.909, kg, PRN Pain Score 4-6, Start date: 13 
8:30:00, Duration: 8 doses or times, Stop date: Limited # of times              
                          IVP                            No Longer Active                        

                                                [facility]                            2013            

                                        [facility]                    

 

                          meperidine                            12.5 mg, 0.25 mL, Route: IVP, Drug form: INJ,

 Q30Min, Dosing Weight 90.909, kg, PRN Other -See Comment, For shivering, Start 
date: 13 8:30:00, Duration: 2 doses or times, Stop date: Limited # of 
times                            IVP                            No Longer Active     

                                                   [facility]                            2013

                                        [facility]                    

 

                          hydromorphone                            0.5 mg, 0.5 mL, Route: IVP, Drug form: SOLN,

 Q5Min, Dosing Weight 90.909, kg, PRN Pain Score 4-6, Start date: 13 
8:30:00, Duration: 5 doses or times, Stop date: Limited # of times              
                          IVP                            No Longer Active                        

                                                [facility]                            2013            

                                        [facility]                    

 

                          fentanyl                            25 microgram, 0.5 mL, Route: IVP, Drug form: INJ,

 Q5Min, Dosing Weight 90.909, kg, PRN Pain Score 4-6, Start date: 13 
8:30:00, Duration: 4 doses or times, Stop date: Limited # of times              
                          IVP                            No Longer Active                        

                                                [facility]                            2013            

                                        [facility]                    

 

                          Lactated Ringers Injection IV 1,000 mL                            1,000 mL, Rate: 

25 ml/hr, Infuse over: 40 hr, Route: IV, kg, Total Volume: 1,000, Start date: 
13 6:38:00, Duration: 30 day, Stop date: 13 6:37:00                 
                    IV                            No Longer Active                              

                          [facility]                            2013               

                                        [facility]                    

 

                          morphine 15 mg oral capsule                            15 mg, 1 cap, PO, BID, PRN,

 Pain, Substitution Allowed, CAP                            PO                     

                    Active                                                                          

                          2013                            [facility]                    

 

                          tizanidine 4 mg oral tablet                            8 mg, 2 tab, PO, TID, 180 tab,

 Substitution Allowed, TAB                            PO                            Active

                                                                                    2013

                                        [facility]                    

 

                          flumazenil                            0.2 mg, 2 mL, Route: IVP, Drug form: INJ, PRN,

 Dosing Weight 104.545, kg, PRN Benzodiazepine Reversal, Initial dose, Start 
date: 12 8:51:00, Duration: 5 doses or times, Stop date: Limited # of 
times                            IVP                            No Longer Active     

                                                   [facility]                            2012

                                        [facility]                    

 

                          naloxone                            0.04 mg, 0.1 mL, Route: IVP, Drug form: INJ, Q2MIN,

 Dosing Weight 104.545, kg, PRN Narcotic Reversal, Start date: 12 8:51:00,
 Duration: 8 doses or times, Stop date: Limited # of times                      
                    IVP                            No Longer Active                                  

                          [facility]                            2012                   

                                        [facility]                    

 

                          fentanyl                            25 microgram, Route: IVP, Q5Min, Dosing Weight

 104.545, kg, PRN Pain Score 4-6, Start date: 12 8:51:00, Duration: 4 
doses or times, Stop date: Limited # of times                            IVP       

                          No Longer Active                                                

                    [facility]                            2012                            [facility]

                    

 

                          morphine Sulfate                            2 mg, 1 mL, Route: IVP, Drug form: INJ,

 Q5Min, Dosing Weight 104.545, kg, PRN Pain Score 4-6, Start date: 12 
8:51:00, Duration: 8 doses or times, Stop date: Limited # of times              
                          IVP                            No Longer Active                        

                                                [facility]                            2012           

                                        [facility]                    

 

                          hydromorphone                            0.5 mg, 0.5 mL, Route: IVP, Drug form: SOLN,

 Q5Min, Dosing Weight 104.545, kg, PRN Pain Score 4-6, Start date: 12 
8:51:00, Duration: 5 doses or times, Stop date: Limited # of times              
                          IVP                            No Longer Active                        

                                                [facility]                            2012           

                                        [facility]                    

 

                          acetaminophen-hydrocodone 325 mg-5 mg oral tablet                            2 tab,

 Route: PO, Drug Form: TAB, Dosing Weight 104.545, kg, Q4H, PRN Pain Score 4-6, 
Start date: 12 8:51:00, Duration: 30 day, Stop date: 10/12/12 8:50:00     
                          PO                            No Longer Active                

                                                [facility]                            2012   

                                        [facility]                    

 

                          ondansetron                            4 mg, Route: IVP, ONCE, Dosing Weight 104.545,

 kg, PRN Nausea & Vomiting, Start date: 12 8:51:00                        
                    IVP                            No Longer Active                                    

                          [facility]                            2012                     

                                        [facility]                    

 

                          Lactated Ringers Injection IV 1,000 mL                            1,000 mL, Rate: 

25 ml/hr, Infuse over: 40 hr, Route: IV, kg, Total Volume: 1,000, Start date: 
12 8:12:00, Duration: 30 day, Stop date: 10/12/12 8:11:00                 
                    IV                            No Longer Active                              

                          [facility]                            2012                  

                                        [facility]                    

 

                          flumazenil                            0.2 mg, 2 mL, Route: IVP, Drug form: INJ, PRN,

 PRN Benzodiazepine Reversal, Initial dose, Start date: 12 8:37:00, 
Duration: 30 day, Stop date: 12 8:36:00                            IVP       

                          No Longer Active                                                

                    [facility]                            2012                            [facility]

                    

 

                          acetaminophen-hydrocodone 325 mg-5 mg oral tablet                            1 tab,

 Route: PO, Drug Form: TAB, Q4H, PRN Pain Score 1-3, Start date: 12 
8:37:00, Duration: 30 day, Stop date: 12 8:36:00                            PO

                            No Longer Active                                         

                          [facility]                            2012                         

                                        [facility]                    

 

                          ondansetron                            4 mg, 2 mL, Route: IVP, Drug form: INJ, ONCE,

 PRN Nausea & Vomiting, Start date: 12 8:37:00                            IVP

                            No Longer Active                                         

                          Fernie                            2012                         

                                        [facility]                    

 

                          naloxone                            0.04 mg, 0.04 mL, Route: IVP, Drug form: INJ, 

Q2MIN, PRN Narcotic Reversal, Start date: 12 8:37:00, Duration: 8 doses or
 times, Stop date: Limited # of times                            IVP               

                    No Longer Active                                                        Fernie

                            2012                            [facility]       

             

 

                          hydromorphone                            0.5 mg, 0.25 mL, Route: IVP, Drug form: INJ,

 Q5Min, PRN Pain Score 4-6, Start date: 12 8:37:00, Duration: 5 doses or 
times, Stop date: Limited # of times                            IVP                

                    No Longer Active                                                        Fernie

                            2012                            [facility]       

             

 

                          fentanyl                            25 microgram, 0.5 mL, Route: IVP, Drug form: INJ,

 Q5Min, PRN Pain Score 4-6, Start date: 12 8:37:00, Duration: 4 doses or 
times, Stop date: Limited # of times                            IVP                

                    No Longer Active                                                        Fernie

                            2012                            [facility]       

             

 

                          Lactated Ringers Injection IV 1,000 mL                            1,000 mL, Rate: 

25 ml/hr, Infuse over: 40 hr, Route: IV, Dosing Weight 104.545 kg, Total Volume:
 1,000, Start date: 12 6:53:00, Duration: 30 day, Stop date: 12 
6:52:00                            IV                            No Longer Active    

                                                    Fernie                            

2012                              [facility]                    

 

                                        multivitamin with minerals Multiple Vitamins with Zinc oral capsule             

                                        1 cap, PO, Daily, 60 cap, Substitution Allowed, Soft Stop, CAP      

                      PO                            Active                             

                                                       2012                    

                                        [facility]                    

 

                          Premarin 0.625 mg oral tablet                            0.625 mg, 1 tab, PO, Daily,

 30 tab, Substitution Allowed, TAB                            PO                   

                    Active                                                                        

                          2012                            [facility]                    

 

                          meloxicam 15 mg oral tablet                            15 mg, 1 tab, PO, Daily, 90

 tab, Substitution Allowed, TAB                            PO                      

                    Active                                                                           

                          2012                            [facility]                    

 

                          Epitol 200 mg oral tablet                            400 mg, 2 tab, PO, BID, 120 tab,

 Substitution Allowed, TAB                            PO                            Active

                                                                                    2012

                                        [facility]                    

 

                          Xopenex HFA 45 mcg/inh inhalation aerosol with adapter                            

2 puff, INHALATION, Q4H, PRN, as needed for wheezing, Substitution Allowed, Soft
 Stop                            INHALATION                            Active        

                                                                            2012 

                                        [facility]                    

 

                          Benadryl                            25 mg, Route: IVP, ONCE, PRN Itching, Start date:

 12 11:02:00                            IVP                            No Longer

 Active                                                        Courtney                

                          2012                            [facility]                    

 

                          Ventolin HFA                            INHALATION, Substitution Allowed, Maintenance

                            INHALATION                            Active             

                                                                            2012      

                                        [facility]                    

 

                          lidocaine 1%                            0.5 mL, Route: SUB-Q, Drug Form: INJ, ONCALL,

 Start date: 01/10/12 15:00:00, Duration: 1 doses or times                      
                    SUB-Q                            No Longer Active                                

                          Aldo                            01/10/2012                 

                                        [facility]                    

 

                          Lactated Ringers Injection IV 1,000 mL                            1,000 mL, Rate: 

25 ml/hr, Infuse over: 40 hr, Route: IV, Total Volume: 1,000, Start date: 
01/10/12 14:44:00, Duration: 30 day, Stop date: 12 14:43:00               
                    IV                            No Longer Active                            

                            Aldo                            01/10/2012             

                                        [facility]                    

 

                          ondansetron                            4 mg, 2 mL, Route: IVP, Drug form: INJ, ONCE,

 PRN Nausea & Vomiting, Start date: 10/26/11 8:37:00                            IVP

                            No Longer Active                                         

                    Reyes                            10/26/2011                            

[facility]                    

 

                          flumazenil                            0.2 mg, 2 mL, Route: IVP, Drug form: INJ, PRN,

 PRN Other -See Comment, Initial dose, Start date: 10/26/11 8:37:00, Duration: 
30 day, Stop date: 11 7:36:00                            IVP                 

                    No Longer Active                                                        Reyes

                            10/26/2011                            [facility]       

             

 

                          naloxone                            0.04 mg, 0.1 mL, Route: IVP, Drug form: INJ, Q2MIN,

 PRN Narcotic Reversal, Start date: 10/26/11 8:37:00, Duration: 8 doses or 
times, Stop date: Limited # of times                            IVP                

                    No Longer Active                                                        Reyes

                            10/26/2011                            [facility]       

             

 

                          meperidine                            12.5 mg, 0.25 mL, Route: IVP, Drug form: INJ,

 Q30Min, PRN Other -See Comment, For shivering, Start date: 10/26/11 8:37:00, 
Duration: 2 doses or times, Stop date: Limited # of times                       
                    IVP                            No Longer Active                                   

                          [facility]                            10/26/2011                    

                                        [facility]                    

 

                          hydromorphone                            0.5 mg, 0.5 mL, Route: IVP, Drug form: SOLN,

 Q5Min, PRN Pain Score 4-6, Start date: 10/26/11 8:37:00, Duration: 5 doses or 
times, Stop date: Limited # of times                            IVP                

                    No Longer Active                                                        [facility]

                            10/26/2011                            [facility]       

             

 

                          fentanyl                            25 microgram, 0.5 mL, Route: IVP, Drug form: INJ,

 Q5Min, PRN Pain Score 4-6, Start date: 10/26/11 8:37:00, Duration: 4 doses or 
times, Stop date: Limited # of times                            IVP                

                    No Longer Active                                                        [facility]

                            10/26/2011                            [facility]       

             

 

                          acetaminophen-hydrocodone 325 mg-5 mg oral tablet                            2 tab,

 Route: PO, Drug Form: TAB, Q4H, PRN Pain Score 4-6, Start date: 10/26/11 
8:37:00, Duration: 30 day, Stop date: 11 8:36:00                            PO

                            No Longer Active                                         

                    [facility]                            10/26/2011                            

[facility]                    

 

                          lidocaine 1%                            0.5 mL, Route: SUB-Q, Drug Form: INJ, ONCALL,

 Start date: 10/26/11 8:00:00, Duration: 1 doses or times                       
                    SUB-Q                            No Longer Active                                 

                          [facility]                            10/26/2011                  

                                        [facility]                    

 

                          Lactated Ringers Injection IV 1,000 mL                            1,000 mL, Rate: 

25 ml/hr, Infuse over: 40 hr, Route: IV, Total Volume: 1,000, Start date: 
10/26/11 7:33:00, Duration: 30 day, Stop date: 11 7:32:00                 
                    IV                            No Longer Active                              

                          [facility]                            10/26/2011               

                                        [facility]                    

 

                          clindamycin 150 mg oral capsule                            1 cap, PO, Q6H, 40 cap,

 Substitution Allowed, CAP                            PO                            Active

                                                                                    10/25/2011

                                        [facility]                    

 

                          Erythrocin Stearate Filmtab 250 mg oral tablet                            1 tab, PO,

 Q8H, 40 tab, Substitution Allowed, TAB                            PO              

                    Active                                                                   

                          10/25/2011                            [facility]                  

  

 

                          Nexium 40 mg oral delayed release capsule                            1 cap, PO, Daily,

 30 cap, Substitution Allowed                            PO                        

                    Active                                                                             

                          10/25/2011                            [facility]                    

 

                          Norco 10/325 oral tablet                            1 tab, PO, BID, PRN, 24 tab, for

 pain, Substitution Allowed, Maintenance                            PO             

                    Active                                                                  

                          10/25/2011                            [facility]                 

   



                                                                                
                                                                                
                                                                                
                                                                                
                                                                                
                                                                                
                                                                                
                                                                                
                                                                                
                                                                                
                                                                                
                                                                                
                                                                                
                                                                                
                                                                                
                                                                                
                                                                                
                                                                                
                                                                                
                                                                                
        



Allergies, Adverse Reactions, Alerts

                    





                    Substance                            Category                            Reaction   

                          Severity                            Reaction type           

                          Status                            Date Reported                     

                          Comments                            Source                    

 

                    aspirin                            Assertion                                        

                                                Drug allergy                            Active

                                                                                    [facility]                    

 

                    Bactrim                            Assertion                                        

                                                Drug allergy                            Active

                                                                                    [facility]                    

 

                    Latex                            Assertion                            Blisters      

                                                      Drug allergy                       

                    Active                                                                            

                                        [facility]                    

 

                    lovastatin                            Assertion                                     

                                                Drug allergy                            

Active                                                                               

                                        [facility]                    

 

                    methadone                            Assertion                                      

                                                Drug allergy                            Active

                                                                                    [facility]                    

 

                    penicillin                            Assertion                                     

                                                Drug allergy                            

Active                                                                               

                                        [facility]                    

 

                    sulfa drugs                            Assertion                                    

                                                      Drug allergy                         

                    Active                                                                              

                                        [facility]                    



                                                                                
                                        



Immunizations

                    





                    Immunization                            Date Given                            Site  

                          Status                            Last Updated             

                          Comments                            Source                    



                                                                        



Results

                    





                    Order Name                            Results                            Value      

                          Reference Range                            Date                

                          Interpretation                            Comments                       

                                        Source                    

 

                          Spine lumbar wo contrast CT                            Spine lumbar wo contrast CT

                                        Patient Name: ALEJANDRO JACKSON

: 1970; Age: 48 years Female

MR: [id number]



Study: Spine lumbar wo contrast CT 10/15/2018 11:29 AM CDT

Clinical Indication:  - M54.16   Radiculopathy, lumbar region worsening symptoms
 with weather changes. 



COMPARISON: Lumbar spine radiograph 01/10/2017



TECHNIQUE: Sequential trans-axial images were obtained with a multi-detector 
helical CT. Coronal and sagittal reconstructions were obtained.



CT imaging performed at this location utilizes radiation dose optimization 
techniques which include one or more of the following:

                                        -Automated exposure control

                                        -Adjustment of the mA and/or kV according to patient size

                                        -Use of iterative reconstruction technique

CT Radiation Dose  mGy-cm



FINDINGS: 

ALIGNMENT AND GENERAL ASSESSMENT: Anterior and interbody lumbar fusion spans the
 L4-L5 and L5-S1 levels. There is complete bony fusion spanning the L4-L5 and 
L5-S1 disc space. Alignment of lumbar spine is within normal limits. Mild to 
moderate degenerative changes of both sacroiliac joints are partially 
demonstrated. Partially demonstrated leads enter the spinal canal at the T12 
level posteriorly. These are present within the posterior aspect of the spinal 
canal. Limited evaluation of the paraspinous soft tissues is unremarkable.



DISC SPACES AND SOFT TISSUES: MRI has higher sensitivity and specificity for 
disc and soft tissue disease.



T12-L1: The disc is normal. There is no central stenosis. There is no foraminal 
stenosis. The facet joints appear normal.



L1-L2: The disc is normal. There is no central stenosis. There is no foraminal 
stenosis. The facet joints appear normal. 



L2-L3: No significant disc bulge protrusion evident. No spinal canal stenosis 
evident. There is no foraminal stenosis. The facet joints appear normal. 



L3-L4: Mild posterior disc bulge. Mild facet arthropathy. No foraminal narrowing
 evident. Evaluation limited due to lack intrathecal contrast. No significant 
spinal canal stenosis evident.



L4-L5: Anterior fusion.  Spinal canal is broad in the setting of posterior 
decompression. No foraminal narrowing. Postsurgical bone fusion of the posterior
 elements.



L5-S1: Anterior interbody fusion. Posterior decompression. Spinal canal is 
broad. No foraminal narrowing demonstrated.



If there is further concern, CT myelogram or MRI of the lumbar spine may be 
performed for complete assessment.





IMPRESSION:

                                        1.  Intact anterior interbody lumbar fusion at the L4-L5 and L5-S1 levels. No significant

 junctional degenerative changes evident.

                                        2.  No foraminal narrowing demonstrated.







SL:  PHILLIP



                                                          10/15/2018                 

                                                      -

                                        -





Read by:  Bar Blank MD

Dictated Date/time:  10/15/18 13:12

Electronically Signed by:  Bar Blank MD                      10/15/18 
13:24

FINAL REPORT

                                        [facility]                    

 

                          Breast Complete Lucho US                            Breast Complete Lucho US          

               





COMPLETE ULTRASOUND OF BOTH BREASTS AND AXILLA: 2018



CLINICAL: /Nodule.  







COMPARISON:Comparison is made to exams dated:  2018 mammogram, 2018 
mammogram, 2017 ultrasound, 2/15/2016 ultrasound, and 2015 ultrasound - 
[facility].  





TECHNIQUE: Color flow and real-time ultrasound of both breasts four quadrants, 
retroareolar, and axilla regions were performed.  Gray scale images of the real-
time examination were reviewed.   







FINDINGS: 

There is a stable benign 9 mm wider than tall oval nodule with a circumscribed 
margin in the right breast at 10 o'clock in the retroareolar region.  This oval 
nodule is hypoechoic.  

No abnormalities were seen sonographically in the left breast or either axilla. 
 





IMPRESSION: BENIGN 



RECOMMENDATION:There is no sonographic evidence of malignancy.  

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  

A 1 year screening mammogram is recommended.(2019)   This exam was 
interpreted at [id number] for [facility].  

Dion Ramey M.D.  

ap/penrad:2018 14:16:26  



Imaging Technologist(s): [facility]

letter sent: BI-RADS 1/2  

Ultrasound BI-RADS: 2 Benign

                                                          2018                 

                                                      -

                                        -





Read by:  Dion Ramey MD

Dictated Date/time:  18 14:16

Electronically Signed by:  Dion Ramey MD                          18 
14:16

FINAL REPORT

                                        [facility]                    

 

                          Breast Mammo Diag UNI incl CAD MA                            Breast Mammo Diag UNI

 incl CAD MA                         





UNILATERAL RIGHT DIGITAL DIAGNOSTIC MAMMOGRAM WITH CAD: 2018





CLINICAL: /Callback.  





Current study was evaluated with a Computer Aided Detection (CAD) system.  



COMPARISON:Comparison is made to exams dated:  2018 mammogram, 2017 
mammogram, 2015 mammogram, 2014 mammogram - [facility], and 2013 mammogram - [facility].   



TECHNIQUE: Mammographic views were obtained using digital acquisition. [employer] 
Version 1.3 was utilized for computer aided detection.  



FINDINGS: 

The tissue of right breast is heterogeneously dense, which could obscure 
detection of small masses.  



There are benign calcifications in the right breast.  

There is a nodule in the right breast at 10 o'clock anterior depth.  This is 
seen in additional views.  

No other significant masses or calcifications are seen in the breast.  





IMPRESSION: INCOMPLETE: NEEDS ADDITIONAL IMAGING EVALUATION



RECOMMENDATION:The nodule in the right breast is indeterminate.  An ultrasound 
is recommended.  

 This exam was interpreted at [id number] for [facility].  



SUMMARY:

Ultrasound will be performed at this time; please see dedicated separate report.
  





Dion Ramey M.D.          

ap/penrad:2018 12:10:40  



Imaging Technologist(s): Alessandra Eng [facility]



Mammogram BI-RADS: 0 Indeterminate

                                                          2018                 

                                                      -

                                        -





Read by:  Dion Ramey MD

Dictated Date/time:  18 12:10

Electronically Signed by:  Dion Ramey MD                          18 
12:10

FINAL REPORT

                                        [facility]                    

 

                          Breast Mammo Scrn LUCHO w lauryn incl CAD MA                            Breast Mammo Scrn

 LUCHO w lauryn incl CAD MA                         





BILATERAL DIGITAL SCREENING MAMMOGRAM 3D/2D WITH CAD: 2018





CLINICAL: /Routine.  





Current study was evaluated with a Computer Aided Detection (CAD) system.  



COMPARISON:Comparison is made to exams dated:  2017 mammogram, 2015 
mammogram, 2014 mammogram - [facility], 2013 
mammogram - [facility], and 10/8/2009 mammogram - [facility].   



TECHNIQUE: Digital Breast Tomosynthesis was performed and utilized for 
Interpretation. [employer] Version 1.3 was utilized for computer aided detection.  



FINDINGS: 

The tissue of both breasts is heterogeneously dense, which could obscure 
detection of small masses.  



There are benign calcifications in the right breast.  

There is a possible mass in the right breast at 10 o'clock anterior depth.  

No other significant masses, calcifications, or other findings are seen in 
either breast.  





IMPRESSION: INCOMPLETE: NEEDS ADDITIONAL IMAGING EVALUATION



RECOMMENDATION:The possible mass in the right breast is indeterminate.  Right 
diagnostic mammogram with possible ultrasound is recommended (spot compression 
and lateral views).  

 This exam was interpreted at [id number] for [facility].  



Shyla tellez/penrad:2018 08:47:34  



Imaging Technologist(s): Lina Mcdowell [facility]

letter sent: BI-RADS 0  

Mammogram BI-RADS: 0 Indeterminate

                                                          2018                 

                                                      -

                                        -





Read by:  Shyla Santana MD

Dictated Date/time:  18 08:47

Electronically Signed by:  Shyla Santana MD                           18 
08:47

FINAL REPORT

                                        [facility]                    

 

                    CARDIAC ENZYMES                            CK MB                            null    

                          0.5 - 3.6                            2017              

                                                                            [facility]     

               

 

                    CARDIAC ENZYMES                            Troponin-I                            null

                            0.00 - 0.40                            2017        

                                                                            [facility]

                    

 

                    CARDIAC ENZYMES                            CK MB Index                            null

                            0.0 - 2.5                            2017          

                                                                            [facility] 

                   

 

                    CARDIAC ENZYMES                            Total CK                            57 unit/L

                             12 - 191                            2017          

                                                                            [facility] 

                   

 

                    CHEM PANEL                            eGFR                            92 mL/min/1.73m2

                                                         2017                  

                                                      Result Comment: The eGFR is calculated using the

 CKD-EPI formula. In most young, healthy individuals the eGFR will be >90 
mL/min/1.73m2. The eGFR declines with age. An eGFR of 60-89 may be normal in 
some populations, particularly the elderly, for whom the CKD-EPI formula has not
 been extensively validated. Use of the eGFR is not recommended in the following
 populations:



Individuals with unstable creatinine concentrations, including pregnant patients
 and those with serious co-morbid conditions.



Patients with extremes in muscle mass or diet. 



The data above are obtained from the National Kidney Disease Education Program 
(NKDEP) which additionally recommends that when the eGFR is used in patients 
with extremes of body mass index for purposes of drug dosing, the eGFR should be
 multiplied by the estimated BMI.                            [facility]          

          

 

                    CHEM PANEL                            Glucose Lvl                            99 mg/dL

                             70 - 99                            2017           

                                                                            [facility]  

                  

 

                    CHEM PANEL                            BUN                            13 mg/dL       

                          7 - 22                            2017                   

                                                                            [facility]          

          

 

                    CHEM PANEL                            Creatinine Lvl                            0.78

 mg/dL                             0.50 - 1.40                            2017 

                                                                                   [facility]

                    

 

                    CHEM PANEL                            Albumin Lvl                            3.8 g/dL

                             3.5 - 5.0                            2017         

                                                                            [facility]

                    

 

                    CHEM PANEL                            Total Protein                            8.2 g/dL

                             6.4 - 8.4                            2017         

                                                                            [facility]

                    

 

                    CHEM PANEL                            Potassium Lvl                            3.8 meq/L

                             3.5 - 5.1                            2017         

                                                                            [facility]

                    

 

                    CHEM PANEL                            ALT                            21 unit/L      

                          0 - 65                            2017                  

                                                                            [facility]         

           

 

                    CHEM PANEL                            Sodium Lvl                            133 meq/L

                             135 - 145                            2017         

                                                                            [facility]

                    

 

                    CHEM PANEL                            Chloride Lvl                            94 meq/L

                             95 - 109                            2017          

                                                                            [facility] 

                   

 

                    CHEM PANEL                            CO2                            31 meq/L       

                          24 - 32                            2017                  

                                                                            [facility]         

           

 

                    CHEM PANEL                            Calcium Lvl                            9.2 mg/dL

                             8.5 - 10.5                            2017        

                                                                            [facility]

                    

 

                    CHEM PANEL                            AST                            19 unit/L      

                          0 - 37                            2017                  

                                                                            [facility]         

           

 

                    CHEM PANEL                            Alk Phos                            158 unit/L

                             39 - 136                            2017          

                                                                            [facility] 

                   

 

                    CHEM PANEL                            Bili Total                            0.7 mg/dL

                             0.2 - 1.3                            2017         

                                                                            [facility]

                    

 

                    CHEM PANEL                            Globulin                            4.4 g/dL  

                           2.7 - 4.2                            2017           

                                                                            [facility]  

                  

 

                    CHEM PANEL                            A/G Ratio                            0.9      

                          0.7 - 1.6                            2017              

                                                                            [facility]     

               

 

                    CHEM PANEL                            B/C Ratio                            17       

                          6 - 25                            2017                  

                                                                            [facility]         

           

 

                    CHEM PANEL                            AGAP                            11.8 meq/L    

                          10.0 - 20.0                            2017           

                                                                            [facility]  

                  

 

                    ENDOCRINOLOGY                            hCG Tot                            null    

                                                      2017                       

                                                                            [facility]              

      

 

                    HEMATOLOGY                            Monocytes #                            0.7 K/CMM

                             0.0 - 0.8                            2017         

                                                                            [facility]                            Eosinophils #                            0.2 K/CMM

                             0.0 - 0.5                            2017         

                                                                            [facility]                            Segs-Bands #                            7.4 K/CMM

                             1.5 - 8.1                            2017         

                                                                            [facility]                            Lymphocytes #                            1.1 K/CMM

                             1.0 - 5.5                            2017         

                                                                            [facility]                            Basophils                            0.5 %    

                          0.0 - 1.0                            2017             

                                                                            [facility]                            Eosinophils                            1.8 %  

                           0.0 - 4.0                            2017           

                                                                            [facility]                            Monocytes                            7.5 %    

                          2.0 - 12.0                            2017            

                                                                            [facility]                            Segs                            79.0 %        

                          45.0 - 75.0                            2017               

                                                                            [facility]                            Lymphocytes                            11.2 % 

                            20.0 - 40.0                            2017        

                                                                            [facility]                            MPV                            8.7 fL         

                          7.4 - 10.4                            2017                 

                                                                            [facility]                            Platelet                            374 K/CMM 

                            133 - 450                            2017          

                                                                            [facility]                            MCHC                            33.5 g/dL     

                          32.0 - 36.0                            2017            

                                                                            [facility]                            Hct                            36.5 %         

                          36.0 - 48.0                            2017                

                                                                            [facility]                            MCH                            29.1 pg        

                          27.0 - 31.0                            2017               

                                                                            [facility]                            MCV                            87.0 fL        

                          80.0 - 98.0                            2017               

                                                                            [facility]                            Hgb                            12.2 g/dL      

                          12.0 - 16.0                            2017             

                                                                            [facility]                            RDW                            14.1 %         

                          11.5 - 14.5                            2017                

                                                                            [facility]                            RBC                            4.20 M/CMM     

                          4.20 - 5.40                            2017            

                                                                            [facility]                            WBC                            9.4 K/CMM      

                          3.7 - 10.4                            2017              

                                                                            [facility]     

               

 

                    Chest 1view DX                            Chest 1view DX                            

Clinical Indication:  - chest pain; 

Comparison: None



FINDINGS: 



AP chest radiographs shows normal lung volumes without interstitial or airspace 
opacities, pleural effusions or pneumothorax. 



The heart size and pulmonary vasculature are normal. The trachea is midline. 
There are no clinically significant osseous abnormalities noted. 



IMPRESSION:

No chest radiographic evidence of acute cardiopulmonary disease.





SL:  [id number]



                                                          2017                 

                                                      -

                                        -





Read by:  Abdirashid Bray MD

Dictated Date/time:  17 10:20

Electronically Signed by:  Abdirashid Bray MD               17 
10:20

FINAL REPORT

                                        [facility]                    

 

                          Breast Complete Uni US                            Breast Complete Uni US          

                                        - BREAST COMPLETE UNI US/R

ULTRASOUND OF RIGHT BREAST AND RIGHT AXILLA: 2017

CLINICAL: Right breast probably benign Mass.  



Comparison is made to exams dated:  2017 mammogram, 2/15/2016 ultrasound, 
2015 ultrasound, 2014 ultrasound, 2015 mammogram and 2014 
mammogram - [facility].  



Color flow and real-time ultrasound of the right breast and axilla were 
performed.  Gray scale images of the real-time examination were reviewed.  All 4
 quadrants, the retroareolar region and axilla are evaluated in this exam. 



There is a stable benign 9 mm wider than tall oval mass with a circumscribed 
margin in the right breast at 10 o'clock middle depth 1 cm from the nipple.  
This oval mass is hypoechoic with posterior acoustic enhancement.  This 
correlates with ultrasound findings but was not seen on the prior mammogram.  
Color flow imaging demonstrates that there is no vascularity present.  

No abnormalities were seen sonographically in the right axilla.  

There has been no significant interval change.  



IMPRESSION: BENIGN 



There is no sonographic evidence of malignancy.  



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 
a fibroadenoma and is benign.  This has shown over 2 years of stability.



A 1 year screening mammogram is recommended. The results were reviewed with the 
patient.  





Shyla juliant/:2017 11:22:36  



Imaging Technologist: Guille Nathan, [facility]

This exam was dictated and interpreted by [id number] for [facility] 
[place].  

letter sent: Normal exam  

Ultrasound BI-RADS: 2 Benign

                                                          2017                 

                                                      -

                                        -





Read by:  Shyla Santana MD

Dictated Date/time:  17 11:22

Electronically Signed by:  Shyla Santana MD                           17 
11:22

FINAL REPORT

                                        [facility]                    

 

                          Breast Mammo Diag LUCHO incl CAD MA                            Breast Mammo Diag LUCHO

 incl CAD MA                             - BREAST MAMMO DIAG LUCHO INCL CAD MA

BILATERAL DIGITAL DIAGNOSTIC MAMMOGRAM WITH CAD: 2017

CLINICAL: Probably benign right breast mass follow up.  



Current study was evaluated with a Computer Aided Detection (CAD) system.  

Comparison is made to exams dated:  2015 mammogram, 2014 mammogram - 
[facility], 2013 mammogram - [facility],
 10/8/2009 mammogram - [facility], 2/15/2016 ultrasound and 2015 
ultrasound - [facility].  

The tissue of both breasts is heterogeneously dense, which could obscure 
detection of small masses.  

There are benign calcifications in the right breast.  

No significant masses, calcifications, or other findings are seen in either 
breast.  

There has been no significant interval change.



IMPRESSION: INCOMPLETE: NEEDS ADDITIONAL IMAGING EVALUATION

There is no mammographic abnormality seen in the right breast to correspond with
 the ultrasound finding at 10 o'clock, however ultrasound is recommended.  

The results were reviewed with the patient.  



SUMMARY:

Ultrasound will be performed at this time; please see dedicated separate report.
  Ultrasound will also reevaluate prior probably benign findings.  





Shyla tellez/jasper:2017 11:20:24  



Imaging Technologist: Alessandra Eng, [facility]

This exam was dictated and interpreted by [id number] for [facility].  



Mammogram BI-RADS: 0 Indeterminate

                                                          2017                 

                                                      -

                                        -





Read by:  Shyla Santana MD

Dictated Date/time:  17 11:20

Electronically Signed by:  Shyla Santana MD                           17 
11:20

FINAL REPORT

                                        [facility]                    

 

                    ELECTROLYTES                            Sodium Lvl                            139 meq/L

                             135 - 145                            2017         

                                                                            [facility]

                    

 

                    ELECTROLYTES                            Chloride Lvl                            102 

meq/L                             95 - 109                            2017     

                                                                               [facility]

                    

 

                    ELECTROLYTES                            CO2                            26 meq/L     

                          24 - 32                            2017                

                                                                            [facility]       

             

 

                    ELECTROLYTES                            Potassium Lvl                            3.5

 meq/L                             3.5 - 5.1                            2017   

                                                                                 [facility]

                    

 

                    ELECTROLYTES                            AGAP                            14.5 meq/L  

                           10.0 - 20.0                            2017         

                                                                            [facility]

                    

 

                          Spine lumbar series DX                            Spine lumbar series DX          

                                        Patient Name: ALEJANDRO JACKSON

: 1970; Age: 46 years Female

MR: [id number]

Study: Spine lumbar series DX 1/10/2017 11:37 AM CST



CLINICAL INDICATION: M54.16   Radiculopathy, lumbar region, evaluate for SCS 
implant/battery placement    



COMPARISON: Lumbar spine radiograph on 12/10/2014



FINDINGS:



Status post fusion of L4-L5 and L5-S1. The anterior metallic screw within the L4
 vertebral body appears in unchanged position without evidence of surrounding 
lucency. Several surgical clips noted anterior to the lumbar vertebral bodies. 
No compression fracture. No subluxation. Stable facet arthropathy of the lower 
lumbar spine. Stable position of the spinal stimulator device.



IMPRESSION: 



Status post fusion of L4-L5 and L5-S1. No significant change since the previous 
lumbar spine radiographs on 12/10/2014.



   





:  [id number]



                                                          01/10/2017                 

                                                      -

                                        -





Read by:  Viktoria Bullock MD

Dictated Date/time:  01/10/17 14:14

Electronically Signed by:  Viktoria Bullock MD                  01/10/17 
14:17

FINAL REPORT

                                        [facility]                    

 

                    Pelvis AP DX                            Pelvis AP DX                            Patient

 Name: ALEJANDRO JACKSON

: 1970; Age: 46 years Female

MR: [id number]

Study: Pelvis AP DX 1/10/2017 11:36 AM CST



CLINICAL INDICATION: M54.16   Radiculopathy, lumbar region, evaluate for SCS 
implant/battery placement 



COMPARISON: None



FINDINGS:

Views and laterality: AP pelvis



No displaced fracture or dislocation. Mild degenerative changes of the bilateral
 hips and bilateral sacroiliac joints. Moderate amount of stool within the 
colon. Spinal stimulator device noted.





IMPRESSION: 



No acute bony abnormalities.







SL:  [id number]



                                                          01/10/2017                 

                                                      -

                                        -





Read by:  Viktoria Bullock MD

Dictated Date/time:  01/10/17 14:13

Electronically Signed by:  Viktroia Bullock MD                  01/10/17 
14:14

FINAL REPORT

                                        [facility]                    

 

                          Abdomen/Pelvis w IV contrast CT                            Abdomen/Pelvis w IV contrast

 CT                                     EXAM: CT ABDOMEN AND PELVIS  WITH     CONTRAST 

DATE:2016 12:28 PM CST .

 

ADDITIONAL DATA: None

 

COMPARISON: None



Dose: DLP 1495 mGy-cm

 

TECHNIQUE: CT of the abdomen and pelvis with intravenous contrast. Multiplanar 
reformats.



IV CONTRAST:  100 cc Omnipaque 300

GI CONTRAST: Oral





FINDINGS: 

Lung bases: Clear

Liver:  Within normal limits.

Spleen:  Within normal limits.

Adrenal glands:  Within normal limits.

Gallbladder/biliary:  Cholecystectomy. Mild, physiologic prominence of the 
common bile duct. No intrahepatic biliary dilatation.

Kidneys:  9 mm right renal cortical hypodensity. Otherwise unremarkable. Normal 
excretion on delayed images.

Pancreas:  Within normal limits 



GI tract:  Constipation pattern in the colon. The appendix is not identified 
similar however, there are no right lower quadrant or pericecal inflammatory 
changes. Small bowel, stomach, and GE junction are unremarkable.



Lymph nodes:  Within normal limits.

Urinary bladder:  Within normal limits.

Reproductive structures:  Hysterectomy. Unremarkable ovaries.

Regional skeleton:  Lower lumbar postoperative changes. Spinal stimulation 
device.



 

IMPRESSION:

No acute abnormality in the abdomen or pelvis. No clearly identified exhalation 
for patient's clinical symptoms.



                                                          2016                 

                                                      -

                                        -





Read by:  Rafael Orona MD

Dictated Date/time:  16 16:04

Electronically Signed by:  Rafael Orona MD                 16 
16:08

FINAL REPORT

                                         [facility]                    

 

                          Breast Complete Lucho US                            Breast Complete Lucho US          

                                        - BREAST COMPLETE LUCHO US

ULTRASOUND OF BOTH BREASTS AND BOTH AXILLA: 2/15/2016

CLINICAL: Probably benign finding - follow up

right breast mass.  



Comparison is made to exams dated:  2015 ultrasound, 2015 mammogram, 
2014 ultrasound, 2014 mammogram - [facility] and 
2013 mammogram - [facility].  



Color flow and real-time ultrasound of both breasts and both axilla were 
performed.  Gray scale images of the real-time examination were reviewed.   For 
both breasts, all 4 quadrants, the retroareolar region and axilla are evaluated 
in this exam.  



There is a stable 9 mm wider than tall oval mass with a circumscribed margin in 
the right breast at 10 o'clock middle depth 1 cm from the nipple.  This oval 
mass is hypoechoic with posterior acoustic enhancement.  This correlates with 
ultrasound findings but was not seen on the prior mammogram.  Color flow imaging
 demonstrates that there is no vascularity present.  

No abnormalities were seen sonographically in the left breast or either axilla. 
 



IMPRESSION: PROBABLY BENIGN - FOLLOW-UP RECOMMENDED



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 
a fibroadenoma and is probably benign. This has shown over 1 year of stability.

 

A follow-up right ultrasound in 12 months is recommended. The results were 
reviewed with the patient.  



Patient will be due for her bilateral diagnostic mammogram in May 2016.  





Shyla tellez/:2/15/2016 13:38:50  



Imaging Technologist: Guille Nathan, [facility]

This exam was dictated and interpreted by [id number] for [facility] 
[place].  

letter sent: Followup  

Ultrasound BI-RADS: 3 Probably benign

                                                          02/15/2016                 

                                                      -

                                        -





Read by:  Shyla Santana MD

Dictated Date/time:  02/15/16 13:38

Electronically Signed by:  Shyla Santana MD                           02/15/16 
13:38

FINAL REPORT

                                        [facility]                    

 

                          Breast Complete Lucho US                            Breast Complete Lucho US          

                                        - BREAST COMPLETE LUCHO US

ULTRASOUND OF BOTH BREASTS AND BOTH AXILLA: 2015

CLINICAL: Pain.  



Comparison is made to exams dated:  2015 mammogram, 2014 ultrasound, 
2014 mammogram - [facility], 2013 mammogram - 
[facility] and 10/8/2009 mammogram - [facility].  

Color flow and real-time ultrasound of both breasts and both axilla were 
performed.  Gray scale images of the real-time examination were reviewed.   For 
both breasts, all 4 quadrants, the retroareolar region and axilla are evaluated 
in this exam.  



There is a benign appearing oval shaped septated cyst with a circumscribed 
margin with posterior enhancement right breast at 10 o'clock that is an 
incidental finding.  

There is a stable 9 mm wider than tall oval mass with a circumscribed margin in 
the right breast at 10 o'clock middle depth 1 cm from the nipple.  This oval 
mass is hypoechoic with posterior acoustic enhancement.  This correlates with 
ultrasound findings but was not seen on the prior mammogram.  Color flow imaging
 demonstrates that there is no vascularity present.  

No abnormalities were seen sonographically in the left breast or either axilla. 
 

There has been no significant interval change.  



IMPRESSION: PROBABLY BENIGN - FOLLOW-UP RECOMMENDED



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 
a fibroadenoma and is probably benign.  A follow-up in 6 months is recommended. 
 



There is no mammographic or sonographic abnormality seen in either breast to 
correspond with the pain which likely represents hormonal stimulation and normal
 fibroglandular tissue.  



A follow-up right ultrasound in 6 months is recommended to demonstrate 
stability. 





Shyla tellez/:2015 14:04:51  



Imaging Technologist: Guille Nathan, [facility]

This exam was dictated and interpreted by [id number] for [facility].  

letter sent: Followup  

Ultrasound BI-RADS: 3 Probably benign

                                                          2015                 

                                                      -

                                        -





Read by:  Shyla Santana MD

Dictated Date/time:  05/07/15 14:04

Electronically Signed by:  Shyla Santana MD                           05/07/15 
14:04

FINAL REPORT

                                        [facility]                    

 

                          Digital Mammo DX Lucho MA                            Digital Mammo DX Lucho MA        

                                         - DIGITAL MAMMO DX LUCHO MA

BILATERAL DIGITAL DIAGNOSTIC MAMMOGRAM WITH CAD: 2015

CLINICAL: Pain.  



Current study was evaluated with a Computer Aided Detection (CAD) system.  

Comparison is made to exams dated:  2014 mammogram - [facility], 2013 mammogram - [facility], 10/8/2009 mammogram
 - [facility] and 2014 ultrasound - [facility].  

The tissue of both breasts is heterogeneously dense, which could obscure 
detection of small masses.  

No significant masses, calcifications, or other findings are seen in either 
breast.  

There has been no significant interval change.



IMPRESSION: INCOMPLETE: NEEDS ADDITIONAL IMAGING EVALUATION

There is no mammographic abnormality seen in either breast to correspond with 
the pain which likely represents hormonal stimulation and normal fibroglandular 
tissue, however ultrasound is recommended.  





SUMMARY:

Ultrasound will be performed at this time; please see dedicated separate report.
  Ultrasound will also reevaluate prior probably benign findings.  





Shyla juliant/penrad:2015 14:02:01  



Imaging Technologist: Monica Toney, [facility]

This exam was dictated and interpreted by [id number] for [facility].  



Mammogram BI-RADS: 0 Indeterminate

                                                          2015                 

                                                      -

                                        -





Read by:  Shyla Santana MD

Dictated Date/time:  05/07/15 14:02

Electronically Signed by:  Shyla Santana MD                           05/07/15 
14:02

FINAL REPORT

                                        [facility]                    

 

                          Spine thoracic 3 views DX                            Spine thoracic 3 views DX    

                                        Thoracic spine, 3 view:

 

Exam reason: See Clinic [id number]   Thoracic or Lumbosacral Neuritis or 
Radiculitis, Unspecified

 

AP, lateral and swimmer's view were obtained.

Mild thoracic spondylosis is noted. There is minimal thoracic curvature convex 
to the left at the lower thoracic spine. Thoracic vertebral body heights are 
well-maintained. No acute fracture is noted. The spinal stimulator leads are 
noted at the lower thoracic spinal canal dorsally with the lead tips present at 
the T9 and T9-T10 levels, respectively.

 

 

SL:16

                                                          12/10/2014                 

                                                      -

                                        -





Read by:  Jabier Roque MD

Dictated Date/time:  12/10/14 16:35

Electronically Signed by:  Jabier Roque MD              12/10/14 
16:37

FINAL REPORT

                                        [facility]                    

 

                          Spine lumbar series DX                            Spine lumbar series DX          

                                        Lumbar spine  five view:

 

Exam reason: See Clinic Indication   724.4   Thoracic or Lumbosacral Neuritis or
 Radiculitis, Unspecified 

 

Fusion at L4-L5 and L5-S1 is noted. A ventral retention screw is noted at the 
ventral inferior L4 vertebral body. Numerous vascular clips are noted about the 
lumbosacral junction. A retention screw at L5-S1 as well as pedicular and elena 
fixation noted on the previous exam, 2007 have been removed. Since that 
exam, there has been placement of a generator at the dorsal right paramedian 
soft tissues with 2 leads entering the dorsal aspect of the spinal canal at the 
level of T11-T12. The tips of the spinal stimulation leads are present at the 
level of T9-T10. Vertebral body heights and interspaces are otherwise well 
maintained. No spondylolysis or spondylolisthesis is noted. No acute fracture or
 dislocation is noted. Surgical clips are noted at the right upper quadrant 
status post cholecystectomy. Ventral marginal spurring is noted at the 
visualized lower thoracic an upper 3 lumbar vertebral bodies.

 

 

SL:16

                                                          12/10/2014                 

                                                      -

                                        -





Read by:  Jabier Roque MD

Dictated Date/time:  12/10/14 16:29

Electronically Signed by:  Jabier Roque MD              12/10/14 
16:35

FINAL REPORT

                                        [facility]                    

 

                    ELECTROLYTES                            Sodium Lvl                            140 meq/L

                             135 - 145                            10/29/2014         

                                                                            [facility]

                    

 

                    ELECTROLYTES                            Potassium Lvl                            3.5

 meq/L                             3.5 - 5.1                            10/29/2014   

                                                                                 [facility]

                    

 

                    ELECTROLYTES                            CO2                            28 meq/L     

                          24 - 32                            10/29/2014                

                                                                            [facility]       

             

 

                    ELECTROLYTES                            Chloride Lvl                            102 

meq/L                             95 - 109                            10/29/2014     

                                                                               [facility]

                    

 

                    ELECTROLYTES                            AGAP                            13.5 meq/L  

                           10.0 - 20.0                            10/29/2014         

                                                                            [facility]

                    

 

                          Bone Density-Dual Energy Absorptionmetry                            Bone Density-Dual

 Energy Absorptionmetry                             - Bone Density-Dual Energy Absorptionmetry



 BONE DENSITY EVALUATION: 2013



CLINICAL DATA: Post menopausal.  



FINDINGS: 

Bone density evaluation was performed 2013 on the AP L1-L4 region of spine
 using Lunar Dual Energy X-Ray Absorptiometry. The BMD average for the exam is 
1.209  g/cm2. The T-score is 0.20 and the Z-score is -0.90.  These values 
indicate 92.0% for age-matched controls.  This matches the World Health 
Organization's criteria for normal bone density and places the patient within 
normal limits of fracture risk.  



An additional bone density evaluation was performed 2013 on the right 
femur neck using Lunar Dual Energy X-Ray Absorptiometry. The BMD average for the
 exam is 1.250  g/cm2. The T-score is 2.20 and the Z-score is 1.60.  These 
values indicate 119.0% for age-matched controls.  This matches the World Health 
Organization's criteria for normal bone density and places the patient within 
normal limits of fracture risk.  



An additional bone density evaluation was performed 2013 on the right 
total femur area using Lunar Dual Energy X-Ray Absorptiometry. The BMD average 
for the exam is 1.328  g/cm2. The T-score is 2.70 and the Z-score is 2.00.  
These values indicate 122.0% for age-matched controls.  This matches the World 
Health Organization's criteria for normal bone density and places the patient 
within normal limits of fracture risk.  



An additional bone density evaluation was performed 2013 on the left femur
 neck using Lunar Dual Energy X-Ray Absorptiometry. The BMD average for the exam
 is 1.118  g/cm2. The T-score is 1.10 and the Z-score is 0.50.  These values 
indicate 106.0% for age-matched controls.  This matches the World Health 
Organization's criteria for normal bone density and places the patient within 
normal limits of fracture risk.  



An additional bone density evaluation was performed 2013 on the left total
 femur area using Lunar Dual Energy X-Ray Absorptiometry. The BMD average for 
the exam is 1.287  g/cm2. The T-score is 2.40 and the Z-score is 1.70.  These 
values indicate 119.0% for age-matched controls.  This matches the World Health 
Organization's criteria for normal bone density and places the patient within 
normal limits of fracture risk.  



IMPRESSION: BONE DENSITY WITHIN NORMAL LIMITS

Patient is at normal risk for fracture.   



Dr. Montez Stock M.D.  

[facility]/penrad:2013 17:26:42  



Imaging Technologist: Jeffrey Iyer, [facility]

                                                          2013                 

                                                      -

                                        -





Read by:  Montez Stock

Dictated Date/time:  13 17:26

Electronically Signed by:  Montez Stock MD         13 
17:26

FINAL REPORT

                                        [facility] [place]                    

 

                          Digital Mammo Screening Lucho MA                            Digital Mammo Screening 

[place]o MA                                  AMENDMENT: 2013   Bladimir Carvajal M.D. 

Previous outside mammograms from  have been received.  No adverse interval 
change accounting for differences in technique.  Patient should return for 
yearly screening mammogram. 



Amended BI-RADS: 1 Negative 

letter sent: Normal exam

 - DIGITAL MAMMO SCREENING LUCHO MA

BILATERAL DIGITAL SCREENING MAMMOGRAM WITH CAD: 2013

CLINICAL: Routine.  



Current study was evaluated with a Computer Aided Detection (CAD) system.  

No prior exams were available for comparison.  Current study contains 8 films.  

The tissue of both breasts is heterogeneously dense. This may lower the 
sensitivity of mammography.  

No significant masses, calcifications, or other findings are seen in either 
breast.  



IMPRESSION: NEGATIVE

There is no mammographic evidence of malignancy.  A screening mammogram in one 
year is recommended. 



Le Thrasher          

/penrad:6/3/2013 09:18:44  



Imaging Technologist: Armand Martinez RT(R)(M), [facility]

letter sent: Normal exam  

Mammogram BI-RADS: 1 Negative

                                                          2013                 

                                                      -

                                        -





Read by:  Le Thrasher

Dictated Date/time:  13 17:11

Electronically Signed by:  Le Thrasher MD         13 
17:11

FINAL REPORT

                                        -

                                        -





Read by:  Le Thrasher

Dictated Date/time:  13 09:18

Electronically Signed by:  Le Thrasher MD         13 
09:18

FINAL REPORT

                                        [facility] [place]                    

 

                    CHEMISTRY                            U Preg                            Negative 

                          (2013 05:30:00)                                Negative                     

                    2013                            Normal                                    

                                        [facility]                    



                                                                                
                                                                                
                                                                                
                                                                                
                                                                                
                                                                                
                                                                                
                                                                                
                                                                                
                                                                                
                                                                                
                                                                                
                                                                                
                                                                                
                                                                                
                



Vital Signs

                    





                    Vital Sign                            Value                            Date         

                          Comments                            Source                    

 

                    Systolic (mm Hg)                            115                             2017

                                                         [place]                 

   

 

                    Diastolic (mm Hg)                            57                             2017

                                                         [facility]                 

   

 

                    Respitory Rate                            15                             2017 

                                                       [facility]                  

  

 

                    Temperature Oral (F)                            98.4 F                            2017

                                                        [facility]                 

   

 

                    Respitory Rate                            15                             2017 

                                                       [facility]                  

  

 

                    Systolic (mm Hg)                            96                             2017

                                                        [facility]                 

   

 

                    Diastolic (mm Hg)                            55                             2017

                                                        [facility]                 

   

 

                    Systolic (mm Hg)                            99                             2017

                                                        [facility]                 

   

 

                    Diastolic (mm Hg)                            50                             2017

                                                        [facility]                 

   

 

                    Respitory Rate                            14                             2017 

                                                       [facility]                  

  

 

                    Heart Rate                            77                             2017     

                                                      [facility]                    

 

                    Weight                            109.091                             2017    

                                                      [facility]                    

 

                    Height                            157.48 cm                            2017   

                                                      [facility]                    



 

                    Temperature Oral (F)                            98.4 F                            2017

                                                        [facility]                 

   

 

                    BMI Calculated                            43.99                             2017

                                                        [facility]                 

   

 

                    Respitory Rate                            20                             05/15/2017 

                                                       [facility]                  

  

 

                    Respitory Rate                            18                             05/15/2017 

                                                       [facility]                  

  

 

                    Systolic (mm Hg)                            105                             05/15/2017

                                                        [facility]                 

   

 

                    Diastolic (mm Hg)                            89                             05/15/2017

                                                        [facility]                 

   

 

                    Systolic (mm Hg)                            150                             05/15/2017

                                                        [facility]                 

   

 

                    Diastolic (mm Hg)                            65                             05/15/2017

                                                        [facility]                 

   

 

                    Respitory Rate                            16                             05/15/2017 

                                                       [facility]                  

  

 

                    Systolic (mm Hg)                            116                             05/15/2017

                                                        [facility]                 

   

 

                    Diastolic (mm Hg)                            67                             05/15/2017

                                                        [facility]                 

   

 

                    BMI Calculated                            42.01                             2017

                                                        [facility]                 

   

 

                    Weight                            104.176                             2017    

                                                      [facility]                    

 

                    Height                            157.48 cm                            2017   

                                                      [facility]                    



 

                    Temperature Oral (F)                            98.0 F                            2017

                                                        [facility]                 

   

 

                    Heart Rate                            92                             2017     

                                                       [facility]                    

 

                    Systolic (mm Hg)                            131                             10/31/2014

                                                         [facility]                 

   

 

                    Diastolic (mm Hg)                            84                             10/31/2014

                                                        [facility]                 

   

 

                    Respitory Rate                            12                             10/31/2014 

                                                        [facility]                  

  

 

                    Respitory Rate                            20                             10/31/2014 

                                                        [facility]                  

  

 

                    Systolic (mm Hg)                            110                             10/31/2014

                                                         [facility]                 

   

 

                    Diastolic (mm Hg)                            56                             10/31/2014

                                                         [facility]                 

   

 

                    Systolic (mm Hg)                            94                             10/31/2014

                                                        [facility]                 

   

 

                    Respitory Rate                            21                             10/31/2014 

                                                        [facility]                  

  

 

                    Diastolic (mm Hg)                            54                             10/31/2014

                                                        [facility]                 

   

 

                    Height                            157.48 cm                            10/29/2014   

                                                      [facility]                    



 

                    BMI Calculated                            36.66                             10/29/2014

                                                         [facility]                 

   

 

                    Weight                            90.909                             10/29/2014     

                                                      [facility]                    

 

                    Temperature Oral (F)                            98.5 F                            10/29/2014

                                                         [facility]                 

   

 

                    Diastolic (mm Hg)                            54                             2013

                                                         [facility]                 

   

 

                    Systolic (mm Hg)                            114                             2013

                                                         [facility]                 

   

 

                    Respitory Rate                            17                             2013 

                                                        [facility]                  

  

 

                    Respitory Rate                            15                             2013 

                                                        [facility]                  

  

 

                    Diastolic (mm Hg)                            59                             2013

                                                         [facility]                 

   

 

                    Systolic (mm Hg)                            108                             2013

                                                         [facility]                 

   

 

                    Diastolic (mm Hg)                            68                             2013

                                                        [facility]                 

   

 

                    Systolic (mm Hg)                            119                             2013

                                                        [facility]                 

   

 

                    Respitory Rate                            24                             2013 

                                                       [facility]                  

  

 

                    Heart Rate                            85                             2013     

                                                      [facility]                    

 

                    Height                            157.48 cm                            2013   

                                                      [facility]                    



 

                    Weight                            90.909                             2013     

                                                      [facility]                    

 

                    Heart Rate                            78                             2013     

                                                      [facility]                    

 

                    Temperature Oral (F)                            98.7 F                            2013

                                                         [facility]                 

   

 

                    Systolic (mm Hg)                            117                             2012

                                                         [facility]                 

   

 

                    Diastolic (mm Hg)                            51                             2012

                                                         [facility]                 

   

 

                    Respitory Rate                            12                             2012 

                                                        [facility]                  

  

 

                    Systolic (mm Hg)                            115                             2012

                                                         [facility]                 

   

 

                    Diastolic (mm Hg)                            74                             2012

                                                         [facility]                 

   

 

                    Respitory Rate                            14                             2012 

                                                        [facility]                  

  

 

                    Systolic (mm Hg)                            138                             2012

                                                         [facility]                 

   

 

                    Diastolic (mm Hg)                            82                             2012

                                                         [facility]                 

   

 

                    Respitory Rate                            11                             2012 

                                                       [facility]                  

  

 

                    Weight                            104.545                             2012    

                                                      [facility]                    

 

                    Height                            157.48 cm                            2012   

                                                      [facility]                    



 

                    Heart Rate                            72                             2012     

                                                       [facility]                    

 

                    Respitory Rate                            16                             2012 

                                                        [facility]                  

  

 

                    Systolic (mm Hg)                            127                             2012

                                                         [facility]                 

   

 

                    Diastolic (mm Hg)                            79                             2012

                                                         [facility]                 

   

 

                    Respitory Rate                            20                             2012 

                                                        [facility]                  

  

 

                    Diastolic (mm Hg)                            89                             2012

                                                         [facility]                 

   

 

                    Systolic (mm Hg)                            118                             2012

                                                         [facility]                 

   

 

                    Diastolic (mm Hg)                            97                             2012

                                                         [facility]                 

   

 

                    Systolic (mm Hg)                            126                             2012

                                                         [facility]                 

   

 

                    Respitory Rate                            19                             2012 

                                                       [facility]                  

  

 

                    Temperature Oral (F)                            98.7 F                            2012

                                                        [facility]                 

   

 

                    Heart Rate                            85                             2012     

                                                      [facility]                    

 

                    Weight                            104.545                             2012    

                                                       [facility]                    

 

                    Height                            157.48 cm                            2012   

                                                       [facility]                    



 

                    Systolic (mm Hg)                            119                             2012

                                                         [facility]                 

   

 

                    Diastolic (mm Hg)                            65                             2012

                                                         [facility]                 

   

 

                    Respitory Rate                            15                             2012 

                                                        [facility]                  

  

 

                    Systolic (mm Hg)                            133                             2012

                                                         [facility]                 

   

 

                    Diastolic (mm Hg)                            64                             2012

                                                         [facility]                 

   

 

                    Respitory Rate                            15                             2012 

                                                        [facility]                  

  

 

                    Systolic (mm Hg)                            134                             2012

                                                         [facility]                 

   

 

                    Diastolic (mm Hg)                            81                             2012

                                                         [facility]                 

   

 

                    Heart Rate                            82                             2012     

                                                       [facility]                    

 

                    Respitory Rate                            20                             2012 

                                                        [facility]                  

  

 

                    Heart Rate                            70                             01/10/2012     

                                                       [facility]                    

 

                    Temperature Oral (F)                            98.1 F                            01/10/2012

                                                         [facility]                 

   

 

                    Height                            157.48 cm                            01/10/2012   

                                                       [facility]                    



 

                    Weight                            95.455                             01/10/2012     

                                                       [facility]                    

 

                    Diastolic (mm Hg)                            60.0                             10/26/2011

                                                         [facility]                 

   

 

                    Systolic (mm Hg)                            128.0                             10/26/2011

                                                         [facility]                 

   

 

                    Respitory Rate                            18.0                             10/26/2011

                                                         [facility]                 

   

 

                    Respitory Rate                            21.0                             10/26/2011

                                                         [facility]                 

   

 

                    Systolic (mm Hg)                            133.0                             10/26/2011

                                                         [facility]                 

   

 

                    Diastolic (mm Hg)                            55.0                             10/26/2011

                                                         [facility]                 

   

 

                    Respitory Rate                            20.0                             10/26/2011

                                                         [facility]                 

   

 

                    Systolic (mm Hg)                            126.0                             10/26/2011

                                                         [facility]                 

   

 

                    Diastolic (mm Hg)                            80.0                             10/26/2011

                                                         [facility]                 

   

 

                    Weight                            81.818                             10/26/2011     

                                                       [facility]                    

 

                    Height                            160.02 cm                            10/26/2011   

                                                       [facility]                    



 

                    Heart Rate                            81.0                             10/26/2011   

                                                       [facility]                    



 

                    Temperature Oral (F)                            97.6 F                            10/26/2011

                                                         [facility]                 

   



                                                                                
                                                                                
                                                                                
                                                                                
                                                                                
                                                                                
                                                                                
                                                                                
                                                                                
                                                                                
                                                                                
                                                                                
                                                                                
                                                                                
                                                                                
                                                                                
                                                                                
                                                                                
                                                                                
                                                                                
                                                                                
                                                                                
                                        



Encounters

                    





                    Location                            Location Details                            Encounter

 Type                            Encounter Number                            Reason For

 Visit                            Attending Provider                            ADM Date

                            DC Date                            Status                

                                        Source                    

 

                    [facility]                                                        DS              

                    [id number]                                                        [place]

 HAMID                             10/26/2011                            10/26/2011  

                          Active                            [facility]                                                        DS              

                    [id number]                                                        [place]

 HAMID                             2012  

                          Active                            [facility]                                                        DS              

                    [id number]                                                        [place]

 HAMID                             2012  

                          Active                            [facility]                                                        DS              

                    [id number]                                                        [place]

 HAMID                             2012  

                          Active                            [facility]                                                        DS              

                    [id number]                                                        [facility]

 HAMID                             2013  

                          Active                            [facility]                                               

                    Outpatient                            [id number]                            

                            Lynne Soleja                             2014                                                    

                                        [facility]                                               

                          OBS Day Surgery                            [id number]                     

                                                Basem Hamid                             10/31/2014  

                          10/31/2014                                                 

                                        [facility]                                               

                    Outpatient                            [id number]                            

                            Base Hamid                             12/10/2014       

                     2014                                                        

[facility]                                               

                    Outpatient                            [id number]                            

                            Non Physician                             2015                                                    

                                        [facility]                                               

                    Outpatient                            [id number]                            

                            Lynne Soleja                             02/15/2016     

                          2016                                                    

                                        [facility]                                                

                          Outpt Diag Services                            [id number]                  

                                                Marcellus Bui                             2016                                               

                                        [facility]                                               

                    Outpatient                            [id number]                            

                            Base Hamid                             01/10/2017       

                     2017                                                        

[facility]                                               

                    Outpatient                            [id number]                            

                            Marcellus Bui                             2017                                                     

                                        [facility]                                               

                          Bedded Outpatient                            [id number]                   

                                                Lobo Darmadi                             05/15/2017

                            05/15/2017                                               

                                        [facility]                                               

                    Emergency                            [id number]                             

                           Navid Iheme                             2017                                                        [facility]                                                        Outpatient      

                          [id number]                            UNK                    

                    BASE HAMID                                                                    

                          Cancel                            [facility]                    

 

                                                                        OD                            

[id number]                            715.09 - GENERAL OSTEOAR                   

                    LYNNE SOLEJA                                                                 

                          Cancel                             [facility] [place]             

       



                                                                                
                                                                                
                                                                                
                                    



Procedures

                    





                    Procedure                            Code                            Date           

                          Perfomer                            Comments                        

                                        Source                    

 

                    Appendectomy                            [id number]                                    

                                                                             [place]

                    

 

                    Bladder operation                            [id number]                               

                                                                                  [place]

                    

 

                    Cholecystectomy                            [id number]                                 

                                                                                [place]

                    

 

                          Fusion of lumbar spine                            [id number]                        

                                                                                                    

 [place]                    

 

                    Hernia repair                            [id number]                                   

                                                                              [place]

                    

 

                    Hysterectomy                            [id number]                                   

                                                                              [place]

                    

 

                          Implantation of electronic stimulator of spine                            [id number]

                                                                                       

                                         [place]                    

 

                          Procedure<sup>1</sup>                            [id number]                         

                                                                            multiple back procedures    

                                         [facility]                    

 

                    Appendectomy                            [id number]                                    

                                                                             [facility] [place]

                    

 

                    Bladder operation                            [id number]                               

                                                                                  [facility]

 [place]                    

 

                    Cholecystectomy                            [id number]                                 

                                                                                [facility] 

[place]                    

 

                          Fusion of lumbar spine                            [id number]                        

                                                                                                    

 [facility] [place]                    

 

                    Hernia repair                            [id number]                                   

                                                                              [facility] [place]

                    

 

                    Hysterectomy                            [id number]                                   

                                                                              [facility] [place]

                    

 

                          Procedure<sup>1</sup>                            [id number]                         

                                                                            multiple back procedures    

                                         JADE Neil                    

 

                          Procedure <sup>1</sup>                            [id number]                       

                                                                            1multiple back procedures 

                                        [facility]

## 2018-12-01 NOTE — XMS REPORT
Summary of Care: 14 - 14

                             Created on: 2043



ALEJANDRO JACKSON

External Reference #: [id number]

: 1970

Sex: Female



Demographics







                          Address                   [address]

[place]  [address]-

 

                          Home Phone                [phone]

 

                          Preferred Language        English

 

                          Marital Status            Single

 

                          [employer] Affiliation     [employer]

 

                          Race                      Other

 

                          Ethnic Group              /Latin





Author







                          Organization              Unknown

 

                          Address                   Unknown

 

                          Phone                     Unavailable







Encounter





HQ Encntr_alias(FIN) [id number] Date(s): 14 - 14

[facility] [address] [address]

Discharge Disposition: Home

Physician Attending: Viridiana Guerrero MD

Physician_Referring: Viridiana Guerrero MD





Reason for Visit





793.80 ABNORMAL FINDING ON MAMMOGRAPHY



Problem List







    



              Condition     Effective Dates     Status       Health Status     Informant

 

    



                           Anxiety(Confirmed)        Resolved  

 

    



                           Asthma(Confirmed)         Resolved  

 

    



                           Back pain(Confirmed)      Resolved  

 

    



                           cough(Confirmed)          Resolved  

 

    



                           Depression annual         Resolved  



                                         review(Confirmed)    

 

    



                           Reflux(Confirmed)         Resolved  

 

    



                           seizures(Confirmed)       Resolved  







Allergies, Adverse Reactions, Alerts







   



                 Substance       Reaction        Severity        Status

 

   



                           aspirin                   Active

 

   



                           Bactrim                   Active

 

   



                     Latex               Blisters            Active

 

   



                           penicillin                Active

 

   



                           sulfa drugs               Active







Medications





No data available for this section



Medications Administered During Your Visit





No data available for this section



Immunizations





No data available for this section



Social History







 



                           Social History Type       Response

## 2018-12-01 NOTE — DIAGNOSTIC IMAGING REPORT
EXAM: CT Abdomen and Pelvis WITH contrast  

INDICATION:      

^generalized abd pain, elevated WBC, constipation

^25135988

^1452 

COMPARISON: CT dated 10/11/2016 and same day K[facility]

TECHNIQUE: Abdomen and pelvis were scanned utilizing a multidetector helical

scanner from the lung base to the pubic symphysis after administration of IV

contrast. Coronal and sagittal reformations were obtained. Dose modulation,

iterative reconstruction, and/or weight based adjustment of the mA/kV was

utilized to reduce the radiation dose to as low as reasonably achievable.

Routine protocol was performed. Scan was performed when during portal venous

phase.    

     IV CONTRAST: 100 mL of Isovue-300

     ORAL CONTRAST: Water

            

COMPLICATIONS: None



RADIATION DOSE:

     Total DLP: 619.16 mGy*cm

     Estimated effective dose: (DLP x 0.015 x size factor) mSv

     CTDIvol has been reviewed. It is below the limits set by the Radiation

Protocol Committee (RPC).



FINDINGS:



LINES and TUBES: Right lower back stimulator device in place. Tips extending to

the thoracic spine.



LOWER THORAX:  Unremarkable



HEPATOBILIARY:      No focal hepatic lesions. No biliary ductal dilation. 



GALLBLADDER: Surgically absent.



SPLEEN: No splenomegaly. 



PANCREAS: Atrophic. No focal masses or ductal dilatation.  



ADRENALS: No adrenal nodules    



KIDNEYS/URETERS: Kidneys enhance symmetrically.  No hydronephrosis. No cystic

or solid mass lesions. Unchanged subcentimeter right inferior pole hypodensity

which is too small to characterize. No stones.



GI TRACT: Diffusely distended small bowel loops along with collapsed distal

ileal loops with transition point in mid abdomen (series 2, image 47).      

Appendix is nonvisualized visualized. Evidence of gastric surgery. Small

sliding hiatal hernia.



PELVIC ORGANS/BLADDER: Hysterectomy. Bladder is under distended.



LYMPH NODES: No lymphadenopathy.



VESSELS: Unremarkable.



PERITONEUM / RETROPERITONEUM: No free air. Trace ascites.



BONES: Anterior L4. Fixation. L4-L5 and L5-S1 disc space narrowing.



SOFT TISSUES: Unremarkable.            



IMPRESSION: 

1.  High-grade small bowel obstruction with transition point in mid abdomen



Signed by: Dr. Henry Centeno MD on 12/1/2018 3:56 PM

## 2018-12-01 NOTE — XMS REPORT
Encounter CCD: 2012 to 2012

                             Created on: 2123



ALEJANDRO JACKSON

External Reference #: [id number]

: 1970

Sex: Female



Demographics







                          Address                   [address] [place] [address]  [address]

 

                          Home Phone                [phone]

 

                          Preferred Language        Unknown

 

                          Marital Status            [employer]

 

                          [employer] Affiliation     [employer]

 

                          Race                      Other

 

                          Ethnic Group              /Latin





Author







                          Author                    Auto Generated

 

                          Organization              [facility]

 

                          Address                   Unknown

 

                          Phone                     Unavailable







Care Team Providers







                    Care Team Member Name    Role                Phone

 

                    Ro Wilson        RP                  [phone]







Allergies, Adverse Reactions, Alerts







  



                     Substance           Reaction            Status

 

  



                           aspirin                   Active

 

  



                           Bactrim                   Active

 

  



                     Latex               Blisters            Active

 

  



                           penicillin                Active

 

  



                           sulfa drugs               Active







Medications







    



              Medication     Instructions     Start Date     End Date     Status

 

    



                 Xopenex HFA 45     2 puff, INHALATION, Q4H, PRN, as     2012      Ordered



                           mcg/inh inhalation        needed for wheezing, Substitution   



                           aerosol with adapter      Allowed, Soft Stop   

 

    



                 multivitamin with     1 cap, PO, Daily, 60 cap,     2012      Ordered



                           minerals Multiple         Substitution Allowed, Soft Stop,   



                           Vitamins with Zinc        CAP   



                                         oral capsule    

 

    



                 Premarin 0.625 mg     0.625 mg, 1 tab, PO, Daily, 30 tab,     2012      Ordered





                           oral tablet               Substitution Allowed, TAB   

 

    



                 meloxicam 15 mg oral     15 mg, 1 tab, PO, Daily, 90 tab,     2012      Ordered





                           tablet                    Substitution Allowed, TAB   

 

    



                 Epitol 200 mg oral     400 mg, 2 tab, PO, BID, 120 tab,     2012      Ordered



                           tablet                    Substitution Allowed, TAB   

 

    



              flumazenil     0.2 mg, 2 mL, Route: IVP, Drug     2012     Discontinued





                                         form: INJ, PRN, PRN Benzodiazepine   



                                         Reversal, Initial dose, Start date:   



                                         12 8:37:00, Duration: 30 day,   



                                         Stop date: 12 8:36:00   

 

    



                 acetaminophen-hydroc     1 tab, Route: PO, Drug Form: TAB,     2012

                                         Discontinued



                           odone 325 mg-5 mg         Q4H, PRN Pain Score 1-3, Start   



                           oral tablet               date: 12 8:37:00, Duration:   



                                         30 day, Stop date: 12 8:36:00   

 

    



                 acetaminophen-hydroc     2 tab, Route: PO, Drug Form: TAB,     2012

                                         Discontinued



                           odone 325 mg-5 mg         Q4H, PRN Pain Score 4-6, Start   



                           oral tablet               date: 12 8:37:00, Duration:   



                                         30 day, Stop date: 12 8:36:00   

 

    



              ondansetron     4 mg, 2 mL, Route: IVP, Drug form:     2012     Completed





                                         INJ, ONCE, PRN Nausea & Vomiting,   



                                         Start date: 12 8:37:00   

 

    



              naloxone     0.04 mg, 0.04 mL, Route: IVP, Drug     2012     Discontinued





                                         form: INJ, Q2MIN, PRN Narcotic   



                                         Reversal, Start date: 12   



                                         8:37:00, Duration: 8 doses or   



                                         times, Stop date: Limited # of   



                                         times   

 

    



              hydromorphone     0.5 mg, 0.25 mL, Route: IVP, Drug     2012     Completed





                                         form: INJ, Q5Min, PRN Pain Score   



                                         4-6, Start date: 12 8:37:00,   



                                         Duration: 5 doses or times, Stop   



                                         date: Limited # of times   

 

    



              fentanyl     25 microgram, 0.5 mL, Route: IVP,     2012     Discontinued





                                         Drug form: INJ, Q5Min, PRN Pain   



                                         Score 4-6, Start date: 12   



                                         8:37:00, Duration: 4 doses or   



                                         times, Stop date: Limited # of   



                                         times   

 

    



              Lactated Ringers     1,000 mL, Rate: 25 ml/hr, Infuse     2012    

 Discontinued



                           Injection IV 1,000        over: 40 hr, Route: IV, Dosing   



                           mL                        Weight 104.545 kg, Total Volume:   



                                         1,000, Start date: 12   



                                         6:53:00, Duration: 30 day, Stop   



                                         date: 12 6:52:00   







Vital Signs







                Most recent to oldest [Reference Range]:    1               2               3

 

                          Height                    157.48 cm 

                    (2012 07:53:00)                           

 

                          Temperature Oral [96.4-99.1 DegF]    98.7 DegF 

                    (2012 08:13:00)                           

 

                          Systolic Blood Pressure [ mmHg]    127 mmHg 

                          (2012 09:30:00)      118 mmHg 

                          (2012 09:15:00)      126 mmHg 

                                        (2012 09:00:00)  

 

                          Diastolic Blood Pressure [60-90 mmHg]    79 mmHg 

                          (2012 09:30:00)      89 mmHg 

                          (2012 09:15:00)      97 mmHg 

*HI*

                                        (2012 09:00:00)  

 

                          Respiratory Rate [14-20 BRMIN]    16 BRMIN 

                          (2012 09:30:00)      20 BRMIN 

                          (2012 09:15:00)      19 BRMIN 

                                        (2012 09:00:00)  

 

                          Peripheral Pulse Rate [ bpm]    85 bpm 

                    (2012 08:13:00)                           

 

                          Weight                    104.545 kg 

                    (2012 07:53:00)

## 2018-12-01 NOTE — XMS REPORT
Summary of Care: 10/31/14 - 10/31/14

                             Created on: 2028



ALEJANDRO JACKSON

External Reference #: [id number]

: 1970

Sex: Female



Demographics







                          Address                   [address]

[place]  [address]-

 

                          Home Phone                [phone]

 

                          Preferred Language        English

 

                          Marital Status            Single

 

                          Restorationism Affiliation     Church

 

                          Race                      Other

 

                          Ethnic Group              /Latin





Author







                          Organization              Unknown

 

                          Address                   Unknown

 

                          Phone                     Unavailable







Encounter





HQ El_antonio(FRANK) [id number] Date(s): 10/31/14 - 10/31/14

[facility] [address] [address]

Discharge Disposition: Home

Physician Attending: Ro Wilson MD

Physician_Referring: Ro Wilson MD





Reason for Visit





.4 724.4 / CPT 94931  [id number]



Vital Signs







                    1                   2                   3



                                         Most recent to   



                                         oldest [Reference   



                                         Range]:   

 

                                        157.48 cm 

                    (10/29/14 3:34 PM)                         



                                         Height   

 

                                        98.5 DegF 

                    (10/29/14 3:34 PM)                         



                                         Temperature Oral   



                                         [96.4-99.1 DegF]   

 

                                        131 mmHg 

                          (10/31/14 2:00 PM)        110 mmHg 

                          (10/31/14 1:30 PM)        94 mmHg 

                                        (10/31/14 1:15 PM)



                                         Systolic Blood   



                                         Pressure [   



                                         mmHg]   

 

                                        84 mmHg 

                          (10/31/14 2:00 PM)        56 mmHg 

*LOW*

                          (10/31/14 1:30 PM)        54 mmHg 

*LOW*

                                        (10/31/14 1:15 PM)



                                         Diastolic Blood   



                                         Pressure [60-90   



                                         mmHg]   

 

                                        12 BRMIN 

*LOW*

                          (10/31/14 1:45 PM)        20 BRMIN 

                          (10/31/14 1:30 PM)        21 BRMIN 

*HI*

                                        (10/31/14 1:15 PM)



                                         Respiratory Rate   



                                         [14-20 BRMIN]   

 

                                        90.909 kg 

                    (10/29/14 3:34 PM)                         



                                         Weight   

 

                                        36.66 m2 

                    (10/29/14 3:34 PM)                         



                                         Body Mass Index   







Problem List







    



              Condition     Effective Dates     Status       Health Status     Informant

 

    



                           Anxiety(Confirmed)        Active  

 

    



                           Asthma(Confirmed)         Active  

 

    



                           Back pain(Confirmed)      Active  

 

    



                           cough(Confirmed)          Active  

 

    



                           Depression(Confirmed      Active  



                                         )    

 

    



                           Reflux(Confirmed)         Active  

 

    



                           seizures(Confirmed)       Active  

 

    



                           Short of breath on        Active  



                                         exertion(Confirmed)    







Allergies, Adverse Reactions, Alerts







   



                 Substance       Reaction        Severity        Status

 

   



                           aspirin                   Active

 

   



                           Bactrim                   Active

 

   



                     Latex               Blisters            Active

 

   



                           penicillin                Active

 

   



                           sulfa drugs               Active







Medications





acetaminophen

1,000 mg, Route: IVPB, Drug form: INJ, ONCE, Dosing Weight 90.909, kg, PRN Pain 
Score 1-3, Start date: 10/31/14 12:55:00, Duration: 1 doses or times, Stop date:
Limited # of times

Start Date: 10/31/14

Stop Date: 10/31/14

Status: Discontinued



clindamycin

600 mg, Route: IVPB, ONCE, Dosing Weight 90.909, kg, Start date: 10/31/14 10:42:
00, Stop date: 10/31/14 10:42:00

Start Date: 10/31/14

Stop Date: 10/31/14

Status: Completed



clonazePAM

0.5 mg, PO, 0 Refill(s)

Start Date: 10/29/14

Status: Ordered



Diovan 160 mg oral tablet

160 mg=1 tab, PO, Daily, 0 Refill(s)

Start Date: 10/29/14

Status: Ordered



diphenhydrAMINE

12.5 mg, Route: IVP, Drug form: INJ, Q6H, Dosing Weight 90.909, kg, PRN Itching,
Start date: 10/31/14 12:55:00, Duration: 30 day, Stop date: 14 12:54:00

Start Date: 10/31/14

Stop Date: 10/31/14

Status: Discontinued



fentaNYL

25 microgram, Route: IVP, Q5Min, Dosing Weight 90.909, kg, PRN Pain Score 4-6, S
tart date: 10/31/14 12:55:00, Duration: 4 doses or times, Stop date: Limited # o
f times

Start Date: 10/31/14

Stop Date: 10/31/14

Status: Discontinued



fentaNYL

50 microgram, Route: IVP, Q5Min, Dosing Weight 90.909, kg, PRN Pain Score 7-10, 
Start date: 10/31/14 12:55:00, Duration: 2 doses or times, Stop date: Limited # 
of times

Start Date: 10/31/14

Stop Date: 10/31/14

Status: Discontinued



Flexeril 10 mg oral tablet

10 mg=1 tab, PO, TID, 0 Refill(s)

Start Date: 10/29/14

Status: Ordered



flumazenil

0.2 mg, Route: IVP, PRN, Dosing Weight 90.909, kg, PRN Benzodiazepine Reversal, 
Initial dose, Start date: 10/31/14 12:55:00, Duration: 30 day, Stop date:  11:54:00

Start Date: 10/31/14

Stop Date: 10/31/14

Status: Discontinued



gabapentin

300 mg, PO, BID, 0 Refill(s)

Start Date: 10/29/14

Status: Ordered



hydrochlorothiazide

12.5 mg, PO, 0 Refill(s)

Start Date: 10/29/14

Status: Ordered



hydromorphone

0.5 mg, Route: IVP, Q5Min, Dosing Weight 90.909, kg, PRN Pain Score 7-10, Start 
date: 10/31/14 12:55:00, Duration: 4 doses or times, Stop date: Limited # of thania
es

Start Date: 10/31/14

Stop Date: 10/31/14

Status: Discontinued



Lactated Ringers Injection IV 1000 mL

1,000 mL, Rate: 125 ml/hr, Infuse over: 8 hr, Route: IV, Dosing Weight 90.909 kg
, Total Volume: 1,000, Start date: 10/31/14 12:55:00, Duration: 30 day, Stop isrrael
e: 14 12:54:00

Start Date: 10/31/14

Stop Date: 10/31/14

Status: Discontinued



Lactated Ringers Injection IV 1000 mL

1,000 mL, Rate: 25 ml/hr, Infuse over: 40 hr, Route: IV, Dosing Weight 90.909 kg
, Total Volume: 1,000, Start date: 10/31/14 10:38:00, Duration: 30 day, Stop isrrael
e: 14 10:37:00

Start Date: 10/31/14

Stop Date: 10/31/14

Status: Discontinued



meperidine

12.5 mg, Route: IVP, Q30Min, Dosing Weight 90.909, kg, PRN Other -See Comment, F
or shivering, Start date: 10/31/14 12:55:00, Duration: 2 doses or times, Stop da
te: Limited # of times

Start Date: 10/31/14

Stop Date: 10/31/14

Status: Discontinued



Mobic 15 mg oral tablet

15 mg=1 tab, PO, Daily, 0 Refill(s)

Start Date: 10/29/14

Status: Ordered



morphine Sulfate

4 mg, Route: IVP, Q5Min, Dosing Weight 90.909, kg, PRN Pain Score 7-10, Start da
te: 10/31/14 12:55:00, Duration: 3 doses or times, Stop date: Limited # of times

Start Date: 10/31/14

Stop Date: 10/31/14

Status: Discontinued



morphine Sulfate

2 mg, Route: IVP, Q5Min, Dosing Weight 90.909, kg, PRN Pain Score 4-6, Start isrrael
e: 10/31/14 12:55:00, Duration: 5 doses or times, Stop date: Limited # of times

Start Date: 10/31/14

Stop Date: 10/31/14

Status: Discontinued



naloxone

0.04 mg, Route: IVP, Q2MIN, Dosing Weight 90.909, kg, PRN Narcotic Reversal, Sta
rt date: 10/31/14 12:55:00, Duration: 8 doses or times, Stop date: Limited # of 
times

Start Date: 10/31/14

Stop Date: 10/31/14

Status: Discontinued



Neurontin 300 mg oral capsule

300 mg=1 cap, PO, TID, 0 Refill(s)

Start Date: 10/29/14

Status: Ordered



ondansetron

4 mg, Route: IVP, ONCE, Dosing Weight 90.909, kg, PRN Nausea & Vomiting, Start 
date: 10/31/14 12:55:00

Start Date: 10/31/14

Stop Date: 10/31/14

Status: Discontinued



oxyCODONE

5 mg, Route: PO, Drug form: TAB, Q4H, Dosing Weight 90.909, kg, PRN Pain Score 4
-6, Start date: 10/31/14 12:55:00, Duration: 30 day, Stop date: 14 12:54:0
0

Start Date: 10/31/14

Stop Date: 10/31/14

Status: Discontinued



oxyCODONE

10 mg, Route: PO, Drug form: TAB, Q4H, Dosing Weight 90.909, kg, PRN Pain Score 
7-10, Start date: 10/31/14 12:55:00, Duration: 30 day, Stop date: 14 12:54
:00

Start Date: 10/31/14

Stop Date: 10/31/14

Status: Discontinued



oxyCODONE 5 mg immediate release

10 mg, Route: PO, Drug form: TAB, ONCE, Dosing Weight 90.909, kg, PRN as needed 
for pain, Start date: 10/31/14 13:32:00

Start Date: 10/31/14

Stop Date: 10/31/14

Status: Completed



oxyCODONE 5 mg/5 mL oral solution

5 mg, Route: NG, Drug form: LIQ, Q4H, Dosing Weight 90.909, kg, PRN Pain Score 4
-6, Start date: 10/31/14 12:55:00, Duration: 30 day, Stop date: 14 12:54:0
0

Start Date: 10/31/14

Stop Date: 10/31/14

Status: Discontinued



oxyCODONE 5 mg/5 mL oral solution

10 mg, Route: NG, Drug form: LIQ, Q4H, Dosing Weight 90.909, kg, PRN Pain Score 
7-10, Start date: 10/31/14 12:55:00, Duration: 30 day, Stop date: 14 12:54
:00

Start Date: 10/31/14

Stop Date: 10/31/14

Status: Discontinued



promethazine

6.25 mg, Route: IVPB, ONCE, Dosing Weight 90.909, kg, PRN Nausea & Vomiting, 
Start date: 10/31/14 12:55:00

Start Date: 10/31/14

Stop Date: 10/31/14

Status: Discontinued



SEROquel 25 mg oral tablet

25 mg=1 tab, PO, TID, 0 Refill(s)

Start Date: 10/29/14

Status: Ordered



sertraline 100 mg oral tablet

100 mg=1 tab, PO, Daily, 0 Refill(s)

Start Date: 10/29/14

Status: Ordered



Zofran 4 mg oral tablet

4 mg=1 tab, PO, Q8H, as needed for nausea/vomiting, 0 Refill(s)

Start Date: 10/29/14

Status: Ordered



ZyrTEC 10 mg oral tablet

10 mg=1 tab, PO, Daily, 0 Refill(s)

Start Date: 10/29/14

Status: Ordered



Results





ELECTROLYTES





 



                           Most recent to            1



                                         oldest [Reference 



                                         Range]: 

 

 



                           Sodium Lvl [135-145       140 mEq/L



                           mEq/L]                    (10/29/14 4:19 PM)

 

 



                           Potassium Lvl             3.5 mEq/L



                           [3.5-5.1 mEq/L]           (10/29/14 4:19 PM)

 

 



                           Chloride Lvl [      102 mEq/L



                           mEq/L]                    (10/29/14 4:19 PM)

 

 



                           CO2 [24-32 mEq/L]         28 mEq/L



                                         (10/29/14 4:19 PM)

 

 



                           AGAP [10.0-20.0           13.5 mEq/L



                           mEq/L]                    (10/29/14 4:19 PM)







Medications Administered During Your Visit





No data available for this section



Immunizations





No data available for this section



Procedures







   



                 Procedure Type     Body Site       Date of Procedure     Related Diagnosis

 

   



                                         Appendectomy   

 

   



                                         Bladder operation   

 

   



                                         Cholecystectomy   

 

   



                                         Fusion of lumbar spine   

 

   



                                         Hernia repair   

 

   



                                         Hysterectomy   







Social History







 



                           Social History Type       Response

 

 



                           Smoking Status            Former smoker, Type: Cigarettes, Exposure to Tobacco Smoke None,

 Cigarette



                                         Smoking Last 365 Days No, Reg Smoking Cessation Counseling No

## 2018-12-01 NOTE — XMS REPORT
Summary of Care: 12/10/14 - 12/10/14

                             Created on: 2016



ALEJANDRO JACKSON

External Reference #: [id number]

: 1970

Sex: Female



Demographics







                          Address                   [address]

[place]  [address]-

 

                          Home Phone                [phone]

 

                          Preferred Language        English

 

                          Marital Status            Single

 

                          Scientology Affiliation     Worship

 

                          Race                      Other

 

                          Ethnic Group              /Latin





Author







                          Organization              Unknown

 

                          Address                   Unknown

 

                          Phone                     Unavailable







Encounter





HQ Martinr_antonio(FIN) [id number] Date(s): 12/10/14 - 12/10/14

[facility] [address] [address]

Discharge Disposition: Home

Physician Attending: Ro Wilson MD





Reason for Visit





724.4



Problem List







    



              Condition     Effective Dates     Status       Health Status     Informant

 

    



                           Anxiety(Confirmed)        Active  

 

    



                           Asthma(Confirmed)         Active  

 

    



                           Back pain(Confirmed)      Active  

 

    



                           cough(Confirmed)          Active  

 

    



                           Depression(Confirmed      Active  



                                         )    

 

    



                           Reflux(Confirmed)         Active  

 

    



                           seizures(Confirmed)       Active  

 

    



                           Short of breath on        Active  



                                         exertion(Confirmed)    







Allergies, Adverse Reactions, Alerts







   



                 Substance       Reaction        Severity        Status

 

   



                           aspirin                   Active

 

   



                           Bactrim                   Active

 

   



                     Latex               Blisters            Active

 

   



                           penicillin                Active

 

   



                           sulfa drugs               Active







Medications





No data available for this section



Medications Administered During Your Visit





No data available for this section



Immunizations





No data available for this section



Social History







 



                           Social History Type       Response

 

 



                           Smoking Status            Former smoker, Type: Cigarettes, Exposure to Tobacco Smoke None,

 Cigarette



                                         Smoking Last 365 Days No, Reg Smoking Cessation Counseling No

## 2018-12-01 NOTE — XMS REPORT
Summary of Care: 2/15/16 - 2/15/16

                             Created on: 2048



ALEJANDRO JACKSON

External Reference #: [id number]

: 1970

Sex: Female



Demographics







                          Address                   [address]

[place]  [address]

 

                          Home Phone                [phone]

 

                          Preferred Language        English

 

                          Marital Status            Single

 

                          Sikh Affiliation     Rastafari

 

                          Race                      Other

 

                          Ethnic Group              /Latin





Author







                          Author                    [facility]

 

                          Organization              [facility]

 

                          Address                   Unknown

 

                          Phone                     Unavailable







Encounter





HQ Bhavna(FIN) [id number] Date(s): 2/15/16 - 2/15/16

[facility] [address] [place] [address]-     [phone]
[phone]

Discharge Disposition: Home

Attending Physician: Viridiana Guerrero MD





Vital Signs





No data available for this section



Problem List







    



              Condition     Effective Dates     Status       Health Status     Informant

 

    



                           Anxiety(Confirmed)        Active  

 

    



                           Asthma(Confirmed)         Active  

 

    



                           Back pain(Confirmed)      Active  

 

    



                           Depression(Confirmed      Active  



                                         )    

 

    



                           Reflux(Confirmed)         Active  

 

    



                           Short of breath on        Active  



                                         exertion(Confirmed)    







Allergies, Adverse Reactions, Alerts







   



                 Substance       Reaction        Severity        Status

 

   



                           aspirin                   Active

 

   



                           Bactrim                   Active

 

   



                     Latex               Blisters            Active

 

   



                           penicillin                Active

 

   



                           sulfa drugs               Active







Medications





No data available for this section



Results





No data available for this section



Immunizations





No data available for this section



Procedures







   



                 Procedure       Date            Related Diagnosis     Body Site

 

   



                                         Appendectomy   

 

   



                                         Bladder operation   

 

   



                                         Cholecystectomy   

 

   



                                         Fusion of lumbar spine   

 

   



                                         Hernia repair   

 

   



                                         Hysterectomy   

 

   



                                         Procedure1   







1multiple back procedures



Social History







 



                           Social History Type       Response

 

 



                           Smoking Status            Former smoker; Type: Cigarettes; Exposure to Tobacco Smoke None;

 Cigarette



                                         Smoking Last 365 Days No; Reg Smoking Cessation Counseling No







Assessment and Plan





No data available for this section

## 2018-12-01 NOTE — XMS REPORT
Summary of Care: 5/7/15 - 5/7/15

                             Created on: 2116



ALEJANDRO JACKSON

External Reference #: [id number]

: 1970

Sex: Female



Demographics







                          Address                   [address] [place] [address]  [address]

 

                          Home Phone                [phone]

 

                          Preferred Language        English

 

                          Marital Status            Single

 

                          L[employer] day Affiliation     [employer]

 

                          Race                      Other

 

                          Ethnic Group              /Latin





Author







                          Organization              Unknown

 

                          Address                   Unknown

 

                          Phone                     Unavailable







Encounter





HQ El_antonio(FIN) [id number] Date(s): 5/7/15 - 5/7/15

[facility] [address] [place] [address]-     [phone]
[phone]

Discharge Disposition: Home

Physician Attending: Physician, Non Associated MD





Vital Signs





No data available for this section



Problem List







    



              Condition     Effective Dates     Status       Health Status     Informant

 

    



                           Anxiety(Confirmed)        Active  

 

    



                           Asthma(Confirmed)         Active  

 

    



                           Back pain(Confirmed)      Active  

 

    



                           Depression(Confirmed      Active  



                                         )    

 

    



                           Reflux(Confirmed)         Active  

 

    



                           Short of breath on        Active  



                                         exertion(Confirmed)    







Allergies, Adverse Reactions, Alerts







   



                 Substance       Reaction        Severity        Status

 

   



                           aspirin                   Active

 

   



                           Bactrim                   Active

 

   



                     Latex               Blisters            Active

 

   



                           penicillin                Active

 

   



                           sulfa drugs               Active







Medications





No data available for this section



Results





No data available for this section



Immunizations





No data available for this section



Procedures







   



                 Procedure       Date            Related Diagnosis     Body Site

 

   



                                         Appendectomy   

 

   



                                         Bladder operation   

 

   



                                         Cholecystectomy   

 

   



                                         Fusion of lumbar spine   

 

   



                                         Hernia repair   

 

   



                                         Hysterectomy   

 

   



                                         Procedure1   







1multiple back procedures



Social History







 



                           Social History Type       Response

 

 



                           Smoking Status            Former smoker; Type: Cigarettes; Exposure to Tobacco Smoke None;

 Cigarette



                                         Smoking Last 365 Days No; Reg Smoking Cessation Counseling No







Assessment and Plan





No data available for this section

## 2018-12-01 NOTE — XMS REPORT
Summary of Care: 17 - 17

                             Created on: 10/12/2111



ALEJANDRO JACKSON

External Reference #: [id number]

: 1970

Sex: Female



Demographics







                          Address                   [address]

[place]  [address]-

 

                          Home Phone                [phone]

 

                          Preferred Language        English

 

                          Marital Status            Single

 

                          Episcopalian Affiliation     Gnosticism

 

                          Race                      Other

 

                          Ethnic Group              /Latin





Author







                          Author                    [facility]

 

                          Organization              [facility]

 

                          Address                   Unknown

 

                          Phone                     Unavailable







Encounter





HQ Martinr_antonio(FIN) [id number] Date(s): 17 - 17

[facility] [address] [place] [address]-     [phone]
[phone]

Discharge Disposition: Home or Self Care

Attending Physician: Marcellus Bui MD

Referring Physician: Marcellus Bui MD





Vital Signs





No data available for this section



Problem List







    



              Condition     Effective Dates     Status       Health Status     Informant

 

    



                           Anxiety(Confirmed)        Active  

 

    



                           Asthma(Confirmed)         Active  

 

    



                           Back pain(Confirmed)      Active  

 

    



                           Depression(Confirmed      Active  



                                         )    

 

    



                           Reflux(Confirmed)         Active  

 

    



                           Short of breath on        Resolved  



                                         exertion(Confirmed)    







Allergies, Adverse Reactions, Alerts







   



                 Substance       Reaction        Severity        Status

 

   



                           aspirin                   Active

 

   



                           Bactrim                   Active

 

   



                     Latex               Blisters            Active

 

   



                           lovastatin                Active

 

   



                           methadone                 Active

 

   



                           penicillin                Active

 

   



                           sulfa drugs               Active







Medications





No data available for this section



Results





No data available for this section



Immunizations





No data available for this section



Procedures







   



                 Procedure       Date            Related Diagnosis     Body Site

 

   



                                         Appendectomy   

 

   



                                         Bladder operation   

 

   



                                         Cholecystectomy   

 

   



                                         Fusion of lumbar spine   

 

   



                                         Hernia repair   

 

   



                                         Hysterectomy   

 

   



                                         Implantation of electronic stimulator of   



                                         spine   

 

   



                                         Procedure1   







1multiple back procedures



Social History







 



                           Social History Type       Response

 

 



                           Substance Abuse           Use: None.

 

 



                           Alcohol                   Never, Previous treatment: None.

 

 



                           Smoking Status            Former smoker; Type: Cigarettes; Exposure to Tobacco Smoke None;

 Cigarette



                                         Smoking Last 365 Days No; Reg Smoking Cessation Counseling No







Assessment and Plan





No data available for this section

## 2018-12-01 NOTE — XMS REPORT
Patient Summary Document

                             Created on: 2018



ALEJANDRO JACKSON

External Reference #: [id number]

: 1970

Sex: Female



Demographics







                          Address                   [address] [place] [address]

 

                          Home Phone                [phone]

 

                          Preferred Language        Unknown

 

                          Marital Status            Unknown

 

                          Sabianist Affiliation     Unknown

 

                          Race                      Unknown

 

                          Ethnic Group              Unknown





Author







                          Author                    [facility]

 

                          Address                   Unknown

 

                          Phone                     Unavailable







Care Team Providers







                    Care Team Member Name    Role                Phone

 

                    MARIA INES PAT       Unavailable         Unavailable







Problems

This patient has no known problems.



Allergies, Adverse Reactions, Alerts

This patient has no known allergies or adverse reactions.



Medications

This patient has no known medications.



Results







           Test Description    Test Time    Test Comments    Text Results    Atomic Results    Result

 Comments

 

                CHEST SINGLE (PORTABLE)                                        [place] [address]      Patient Name: 
ALEJANDRO JACKSON   MR #: [id number]    : 1970 Age/Sex: 47/F  Acct #: 
[id number] Req #: [id number]  Adm Physician:     Ordered by: BONIFACIO PAT MD 
Report #: [id number]   Location: ER  Room/Bed:     
_______________________________________________________________________________
____________________    Procedure: [id number] DX/CHEST SINGLE (PORTABLE)  Exam 
Date: 17                            Exam Time: 1258       REPORT STATUS: 
Signed    PROCEDURE:   CHEST SINGLE (PORTABLE)   TECHNIQUE:   Portable AP chest 
 INDICATION:   Chest pain   COMPARISON:   None.       FINDINGS:   Lungs are 
clear and symmetrically inflated. No pleural effusions.    Normal heart size and
mediastinal contour. Normal skeleton. Indwelling    spinal stimulator leads.    
      CONCLUSION:   No acute abnormality.               Dictated by:  Carrie Fair M.D. on 2017 at 13:44        Electronically approved by:  
Carrie Fair M.D. on 2017 at    13:44                Dictated By: 
CARRIE FAIR MD  Electronically Signed By: CARRIE FAIR MD on 17 1344 
Transcribed By: CARO on 17 134       COPY TO:   BONIFACIO PAT MD

## 2018-12-01 NOTE — XMS REPORT
Encounter CCD: 2012 to 2012

                             Created on: 2085



ALEJANDRO JACKSON

External Reference #: [id number]

: 1970

Sex: Female



Demographics







                          Address                   [address] [place] [address]  [address]

 

                          Home Phone                [phone]

 

                          Preferred Language        Unknown

 

                          Marital Status            [employer]

 

                          Moravian Affiliation     Alevism

 

                          Race                      Other

 

                          Ethnic Group              /Latin





Author







                          Author                    [employer]

 

                          Organization              [facility]

 

                          Address                   Unknown

 

                          Phone                     Unavailable







Care Team Providers







                    Care Team Member Name    Role                Phone

 

                    Ro Wilson        RP                  [phone]







Allergies, Adverse Reactions, Alerts







  



                     Substance           Reaction            Status

 

  



                           aspirin                   Active

 

  



                           Bactrim                   Active

 

  



                     Latex               Blisters            Active

 

  



                           penicillin                Active

 

  



                           sulfa drugs               Active







Medications







    



              Medication     Instructions     Start Date     End Date     Status

 

    



              Lactated Ringers     1,000 mL, Rate: 25 ml/hr, Infuse     2012    

 Discontinued



                           Injection IV 1,000        over: 40 hr, Route: IV, kg, Total   



                           mL                        Volume: 1,000, Start date: 12   



                                         8:12:00, Duration: 30 day, Stop   



                                         date: 10/12/12 8:11:00   

 

    



              flumazenil     0.2 mg, 2 mL, Route: IVP, Drug     2012     Discontinued





                                         form: INJ, PRN, Dosing Weight   



                                         104.545, kg, PRN Benzodiazepine   



                                         Reversal, Initial dose, Start date:   



                                         12 8:51:00, Duration: 5 doses   



                                         or times, Stop date: Limited # of   



                                         times   

 

    



              naloxone     0.04 mg, 0.1 mL, Route: IVP, Drug     2012     Discontinued





                                         form: INJ, Q2MIN, Dosing Weight   



                                         104.545, kg, PRN Narcotic Reversal,   



                                         Start date: 12 8:51:00,   



                                         Duration: 8 doses or times, Stop   



                                         date: Limited # of times   

 

    



              fentanyl     25 microgram, Route: IVP, Q5Min,     2012     Completed





                                         Dosing Weight 104.545, kg, PRN Pain   



                                         Score 4-6, Start date: 12   



                                         8:51:00, Duration: 4 doses or   



                                         times, Stop date: Limited # of   



                                         times   

 

    



                 morphine Sulfate     2 mg, 1 mL, Route: IVP, Drug form:     2012

                                         Discontinued



                                         INJ, Q5Min, Dosing Weight 104.545,   



                                         kg, PRN Pain Score 4-6, Start date:   



                                         12 8:51:00, Duration: 8 doses   



                                         or times, Stop date: Limited # of   



                                         times   

 

    



              hydromorphone     0.5 mg, 0.5 mL, Route: IVP, Drug     2012     Discontinued





                                         form: SOLN, Q5Min, Dosing Weight   



                                         104.545, kg, PRN Pain Score 4-6,   



                                         Start date: 12 8:51:00,   



                                         Duration: 5 doses or times, Stop   



                                         date: Limited # of times   

 

    



                 acetaminophen-hydroc     2 tab, Route: PO, Drug Form: TAB,     2012

                                         Discontinued



                           odone 325 mg-5 mg         Dosing Weight 104.545, kg, Q4H, PRN   



                           oral tablet               Pain Score 4-6, Start date:   



                                         12 8:51:00, Duration: 30 day,   



                                         Stop date: 10/12/12 8:50:00   

 

    



              ondansetron     4 mg, Route: IVP, ONCE, Dosing     2012     Completed





                                         Weight 104.545, kg, PRN Nausea &   



                                         Vomiting, Start date: 12   



                                         8:51:00   

 

    



                 acetaminophen-hydroc     1 tab, Route: PO, Drug Form: TAB,     2012

                                         Discontinued



                           odone 325 mg-5 mg         Dosing Weight 104.545, kg, Q4H, PRN   



                           oral tablet               Pain Score 1-3, Start date:   



                                         12 8:51:00, Duration: 30 day,   



                                         Stop date: 10/12/12 8:50:00   







Vital Signs







                Most recent to oldest [Reference Range]:    1               2               3

 

                          Height                    157.48 cm 

                    (2012 06:30:00)                           

 

                          Systolic Blood Pressure [ mmHg]    117 mmHg 

                          (2012 09:30:00)      115 mmHg 

                          (2012 09:15:00)      138 mmHg 

                                        (2012 09:00:00)  

 

                          Diastolic Blood Pressure [60-90 mmHg]    51 mmHg 

*LOW*

                          (2012 09:30:00)      74 mmHg 

                          (2012 09:15:00)      82 mmHg 

                                        (2012 09:00:00)  

 

                          Respiratory Rate [14-20 BRMIN]    12 BRMIN 

*LOW*

                          (2012 09:30:00)      14 BRMIN 

                          (2012 09:15:00)      11 BRMIN 

*LOW*

                                        (2012 09:00:00)  

 

                          Peripheral Pulse Rate [ bpm]    72 bpm 

                    (2012 06:30:00)                           

 

                          Weight                    104.545 kg 

                    (2012 06:30:00)

## 2018-12-01 NOTE — XMS REPORT
Encounter CCD: 2013 to 2013

                             Created on: 2050



ALEJANDRO JACKSON

External Reference #: [id number]

: 1970

Sex: Female



Demographics







                          Address                   [address] [place]  [address]

 

                          Home Phone                [phone]

 

                          Preferred Language        Unknown

 

                          Marital Status            Single

 

                          Restorationist Affiliation     Orthodox

 

                          Race                      Other

 

                          Ethnic Group              /Latin





Author







                          Author                    [employer]

 

                          Organization              [facility]

 

                          Address                   Unknown

 

                          Phone                     Unavailable







Care Team Providers







                    Care Team Member Name    Role                Phone

 

                    Viridiana Guerrero                      [phone]







Allergies, Adverse Reactions, Alerts







  



                     Substance           Reaction            Status

 

  



                           aspirin                   Active

 

  



                           Bactrim                   Active

 

  



                     Latex               Blisters            Active

 

  



                           penicillin                Active

 

  



                           sulfa drugs               Active







Problem List







  



                     Condition           Effective Dates     Status

 

  



                           Anxiety                   Resolved

 

  



                           Asthma                    Resolved

 

  



                           Back pain                 Resolved

 

  



                           cough                     Resolved

 

  



                           Depression annual review      Resolved

 

  



                           Reflux                    Resolved

 

  



                           seizures                  Resolved

## 2018-12-01 NOTE — XMS REPORT
Summary of Care: 16 - 16

                             Created on: 2094



ALEJANDRO JACKSON

External Reference #: [id number]

: 1970

Sex: Female



Demographics







                          Address                   [address]

[place]  [address]

 

                          Home Phone                [phone]

 

                          Preferred Language        English

 

                          Marital Status            Single

 

                          Yazidi Affiliation     Congregation

 

                          Race                      Other

 

                          Ethnic Group              /Latin





Author







                          Author                    [facility]

 

                          Organization              [facility]

 

                          Address                   Unknown

 

                          Phone                     Unavailable







Encounter





HQ El_antonio(FIN) [id number] Date(s): 16 - 16

[facility] [address] [address]      [phone]
[phone]

Discharge Disposition: Home or Self Care

Attending Physician: Marcellus Bui MD





Vital Signs





No data available for this section



Problem List







    



              Condition     Effective Dates     Status       Health Status     Informant

 

    



                           Anxiety(Confirmed)        Active  

 

    



                           Asthma(Confirmed)         Active  

 

    



                           Back pain(Confirmed)      Active  

 

    



                           Depression(Confirmed      Active  



                                         )    

 

    



                           Reflux(Confirmed)         Active  

 

    



                           Short of breath on        Active  



                                         exertion(Confirmed)    







Allergies, Adverse Reactions, Alerts







   



                 Substance       Reaction        Severity        Status

 

   



                           aspirin                   Active

 

   



                           Bactrim                   Active

 

   



                     Latex               Blisters            Active

 

   



                           penicillin                Active

 

   



                           sulfa drugs               Active







Medications





No data available for this section



Results





No data available for this section



Immunizations





No data available for this section



Procedures







   



                 Procedure       Date            Related Diagnosis     Body Site

 

   



                                         Appendectomy   

 

   



                                         Bladder operation   

 

   



                                         Cholecystectomy   

 

   



                                         Fusion of lumbar spine   

 

   



                                         Hernia repair   

 

   



                                         Hysterectomy   

 

   



                                         Procedure1   







1multiple back procedures



Social History







 



                           Social History Type       Response

 

 



                           Smoking Status            Former smoker; Type: Cigarettes; Exposure to Tobacco Smoke None;

 Cigarette



                                         Smoking Last 365 Days No; Reg Smoking Cessation Counseling No







Assessment and Plan





No data available for this section

## 2018-12-01 NOTE — XMS REPORT
Encounter CCD: 2013 to 2013

                             Created on: 2064



ALEJANDRO JACKSON

External Reference #: [id number]

: 1970

Sex: Female



Demographics







                          Address                   [address]  [address]

 

                          Home Phone                [phone]

 

                          Preferred Language        Unknown

 

                          Marital Status            Single

 

                          Gnosticism Affiliation     Christianity

 

                          Race                      Other

 

                          Ethnic Group              /Latin





Author







                          Author                    Auto Generated

 

                          Organization              [facility]

 

                          Address                   Unknown

 

                          Phone                     Unavailable







Care Team Providers







                    Care Team Member Name    Role                Phone

 

                    Ro Wilson        RP                  [phone]







Allergies, Adverse Reactions, Alerts







  



                     Substance           Reaction            Status

 

  



                           aspirin                   Active

 

  



                           Bactrim                   Active

 

  



                     Latex               Blisters            Active

 

  



                           penicillin                Active

 

  



                           sulfa drugs               Active







Problem List







  



                     Condition           Effective Dates     Status

 

  



                           Anxiety                   Resolved

 

  



                           Asthma                    Resolved

 

  



                           Back pain                 Resolved

 

  



                           cough                     Resolved

 

  



                           Depression annual review      Resolved

 

  



                           Reflux                    Resolved

 

  



                           seizures                  Resolved







Medications







    



              Medication     Instructions     Start Date     End Date     Status

 

    



              flumazenil     0.2 mg, 2 mL, Route: IVP, Drug     2013     Discontinued





                                         form: INJ, PRN, Dosing Weight   



                                         90.909, kg, PRN Benzodiazepine   



                                         Reversal, Initial dose, Start date:   



                                         13 8:30:00, Duration: 30 day,   



                                         Stop date: 13 8:29:00   

 

    



              naloxone     0.04 mg, 0.04 mL, Route: IVP, Drug     2013     Discontinued





                                         form: INJ, Q2MIN, Dosing Weight   



                                         90.909, kg, PRN Narcotic Reversal,   



                                         Start date: 13 8:30:00,   



                                         Duration: 8 doses or times, Stop   



                                         date: Limited # of times   

 

    



                 diphenhydrAMINE     12.5 mg, 0.25 mL, Route: IVP, Drug     2013 

                                         Discontinued



                                         form: INJ, PRN, Dosing Weight   



                                         90.909, kg, PRN Itching, Start   



                                         date: 13 8:30:00, Duration:   



                                         30 day, Stop date: 13 8:29:00   

 

    



              ondansetron     4 mg, 2 mL, Route: IVP, Drug form:     2013     Discontinued





                                         INJ, ONCE, Dosing Weight 90.909,   



                                         kg, PRN Nausea & Vomiting, Start   



                                         date: 13 8:30:00   

 

    



              dexamethasone     4 mg, 1 mL, Route: IVP, Drug form:     2013     

Discontinued



                                         INJ, ONCE, Dosing Weight 90.909,   



                                         kg, PRN Nausea & Vomiting, Start   



                                         date: 13 8:30:00   

 

    



              labetalol     5 mg, 1 mL, Route: IVP, Drug form:     2013     Discontinued





                                         INJ, Q5Min, Dosing Weight 90.909,   



                                         kg, PRN Elevated BP, Start date:   



                                         13 8:30:00, Duration: 5 doses   



                                         or times, Stop date: Limited # of   



                                         times   

 

    



              niCARdipine     0.25 mg, 0.1 mL, Route: IVP, Drug     2013     Discontinued





                                         form: INJ, Q5Min, Dosing Weight   



                                         90.909, kg, PRN Elevated BP, Start   



                                         date: 13 8:30:00, Duration: 4   



                                         doses or times, Stop date: Limited   



                                         # of times   

 

    



              metoprolol     1 mg, 1 mL, Route: IVP, Drug form:     2013     Discontinued





                                         INJ, Q5Min, Dosing Weight 90.909,   



                                         kg, PRN Elevated BP, Start date:   



                                         13 8:30:00, Duration: 5 doses   



                                         or times, Stop date: Limited # of   



                                         times   

 

    



              hydrALAZINE     5 mg, 0.25 mL, Route: IVP, Drug     2013     Discontinued





                                         form: INJ, Q5Min, Dosing Weight   



                                         90.909, kg, PRN Elevated BP, Start   



                                         date: 13 8:30:00, Duration: 4   



                                         doses or times, Stop date: Limited   



                                         # of times   

 

    



                 morphine Sulfate     2 mg, 1 mL, Route: IVP, Drug form:     2013

                                         Discontinued



                                         INJ, Q5Min, Dosing Weight 90.909,   



                                         kg, PRN Pain Score 4-6, Start date:   



                                         13 8:30:00, Duration: 8 doses   



                                         or times, Stop date: Limited # of   



                                         times   

 

    



              meperidine     12.5 mg, 0.25 mL, Route: IVP, Drug     2013     Discontinued





                                         form: INJ, Q30Min, Dosing Weight   



                                         90.909, kg, PRN Other -See Comment,   



                                         For shivering, Start date: 13   



                                         8:30:00, Duration: 2 doses or   



                                         times, Stop date: Limited # of   



                                         times   

 

    



              hydromorphone     0.5 mg, 0.5 mL, Route: IVP, Drug     2013     Discontinued





                                         form: SOLN, Q5Min, Dosing Weight   



                                         90.909, kg, PRN Pain Score 4-6,   



                                         Start date: 13 8:30:00,   



                                         Duration: 5 doses or times, Stop   



                                         date: Limited # of times   

 

    



              fentanyl     25 microgram, 0.5 mL, Route: IVP,     2013     Discontinued





                                         Drug form: INJ, Q5Min, Dosing   



                                         Weight 90.909, kg, PRN Pain Score   



                                         4-6, Start date: 13 8:30:00,   



                                         Duration: 4 doses or times, Stop   



                                         date: Limited # of times   

 

    



              Lactated Ringers     1,000 mL, Rate: 25 ml/hr, Infuse     2013    

 Discontinued



                           Injection IV 1,000        over: 40 hr, Route: IV, kg, Total   



                           mL                        Volume: 1,000, Start date: 13   



                                         6:38:00, Duration: 30 day, Stop   



                                         date: 13 6:37:00   

 

    



                 morphine 15 mg oral     15 mg, 1 cap, PO, BID, PRN, Pain,     2013      Ordered





                           capsule                   Substitution Allowed, CAP   

 

    



                 tizanidine 4 mg oral     8 mg, 2 tab, PO, TID, 180 tab,     2013      Ordered



                           tablet                    Substitution Allowed, TAB   







Vital Signs







                Most recent to oldest [Reference Range]:    1               2               3

 

                          Height                    157.48 cm 

                    (2013 14:13:00)                           

 

                          Temperature Oral [96.4-99.1 DegF]    98.7 DegF 

                    (2013 14:06:00)                           

 

                          Systolic Blood Pressure [ mmHg]    114 mmHg 

                          (2013 09:00:00)      108 mmHg 

                          (2013 08:45:00)      119 mmHg 

                                        (2013 08:30:00)  

 

                          Diastolic Blood Pressure [60-90 mmHg]    54 mmHg 

*LOW*

                          (2013 09:00:00)      59 mmHg 

*LOW*

                          (2013 08:45:00)      68 mmHg 

                                        (2013 08:30:00)  

 

                          Respiratory Rate [14-20 BRMIN]    17 BRMIN 

                          (2013 09:00:00)      15 BRMIN 

                          (2013 08:45:00)      24 BRMIN 

*HI*

                                        (2013 08:30:00)  

 

                          Peripheral Pulse Rate [ bpm]    85 bpm 

                          (2013 06:38:00)      78 bpm 

                          (2013 14:06:00)       

 

                          Weight                    90.909 kg 

                    (2013 14:13:00)                           







Results





CHEMISTRY





                          Most recent to oldest [Reference Range]:    1

 

                          U Preg [Negative]         Negative 

                                        (2013 05:30:00)  







Procedures







  



                     Procedures          Date                Related Diagnosis

 

  



                                         Procedure  1  







1multiple back procedures